# Patient Record
Sex: FEMALE | Race: WHITE | Employment: OTHER | URBAN - METROPOLITAN AREA
[De-identification: names, ages, dates, MRNs, and addresses within clinical notes are randomized per-mention and may not be internally consistent; named-entity substitution may affect disease eponyms.]

---

## 2023-12-29 ENCOUNTER — HOSPITAL ENCOUNTER (INPATIENT)
Age: 78
LOS: 5 days | Discharge: HOME | DRG: 322 | End: 2024-01-03
Attending: EMERGENCY MEDICINE | Admitting: FAMILY MEDICINE
Payer: COMMERCIAL

## 2023-12-29 ENCOUNTER — APPOINTMENT (OUTPATIENT)
Dept: GENERAL RADIOLOGY | Age: 78
DRG: 322 | End: 2023-12-29
Payer: COMMERCIAL

## 2023-12-29 ENCOUNTER — APPOINTMENT (OUTPATIENT)
Dept: CT IMAGING | Age: 78
DRG: 322 | End: 2023-12-29
Payer: COMMERCIAL

## 2023-12-29 DIAGNOSIS — K81.0 ACUTE CHOLECYSTITIS: Primary | ICD-10-CM

## 2023-12-29 DIAGNOSIS — R79.89 ELEVATED TROPONIN: ICD-10-CM

## 2023-12-29 DIAGNOSIS — E87.20 LACTIC ACIDOSIS: ICD-10-CM

## 2023-12-29 PROBLEM — I21.4 NSTEMI (NON-ST ELEVATED MYOCARDIAL INFARCTION) (HCC): Status: ACTIVE | Noted: 2023-12-29

## 2023-12-29 LAB
ALBUMIN SERPL-MCNC: 3.7 G/DL (ref 3.4–5)
ALBUMIN/GLOB SERPL: 1.5 {RATIO} (ref 1.1–2.2)
ALP SERPL-CCNC: 101 U/L (ref 40–129)
ALT SERPL-CCNC: 8 U/L (ref 10–40)
ANION GAP SERPL CALCULATED.3IONS-SCNC: 11 MMOL/L (ref 3–16)
ANTI-XA UNFRAC HEPARIN: <0.1 IU/ML (ref 0.3–0.7)
APTT BLD: 25.6 SEC (ref 22.7–35.9)
AST SERPL-CCNC: 16 U/L (ref 15–37)
BACTERIA URNS QL MICRO: ABNORMAL /HPF
BASOPHILS # BLD: 0.1 K/UL (ref 0–0.2)
BASOPHILS NFR BLD: 0.6 %
BILIRUB SERPL-MCNC: 0.4 MG/DL (ref 0–1)
BILIRUB UR QL STRIP.AUTO: NEGATIVE
BUN SERPL-MCNC: 9 MG/DL (ref 7–20)
CALCIUM SERPL-MCNC: 8.5 MG/DL (ref 8.3–10.6)
CHLORIDE SERPL-SCNC: 103 MMOL/L (ref 99–110)
CLARITY UR: ABNORMAL
CO2 SERPL-SCNC: 20 MMOL/L (ref 21–32)
COLOR UR: YELLOW
CREAT SERPL-MCNC: <0.5 MG/DL (ref 0.6–1.2)
DEPRECATED RDW RBC AUTO: 14.3 % (ref 12.4–15.4)
EOSINOPHIL # BLD: 0 K/UL (ref 0–0.6)
EOSINOPHIL NFR BLD: 0.1 %
EPI CELLS #/AREA URNS AUTO: 1 /HPF (ref 0–5)
GFR SERPLBLD CREATININE-BSD FMLA CKD-EPI: >60 ML/MIN/{1.73_M2}
GLUCOSE SERPL-MCNC: 139 MG/DL (ref 70–99)
GLUCOSE UR STRIP.AUTO-MCNC: 100 MG/DL
HCT VFR BLD AUTO: 40.7 % (ref 36–48)
HGB BLD-MCNC: 13.9 G/DL (ref 12–16)
HGB UR QL STRIP.AUTO: ABNORMAL
HYALINE CASTS #/AREA URNS AUTO: 1 /LPF (ref 0–8)
INR PPP: 0.96 (ref 0.84–1.16)
KETONES UR STRIP.AUTO-MCNC: 15 MG/DL
LACTATE BLDV-SCNC: 1.6 MMOL/L (ref 0.4–1.9)
LACTATE BLDV-SCNC: 2 MMOL/L (ref 0.4–1.9)
LEUKOCYTE ESTERASE UR QL STRIP.AUTO: ABNORMAL
LIPASE SERPL-CCNC: 20 U/L (ref 13–60)
LYMPHOCYTES # BLD: 0.8 K/UL (ref 1–5.1)
LYMPHOCYTES NFR BLD: 9 %
MAGNESIUM SERPL-MCNC: 1.8 MG/DL (ref 1.8–2.4)
MCH RBC QN AUTO: 29.4 PG (ref 26–34)
MCHC RBC AUTO-ENTMCNC: 34.1 G/DL (ref 31–36)
MCV RBC AUTO: 86.3 FL (ref 80–100)
MONOCYTES # BLD: 0.3 K/UL (ref 0–1.3)
MONOCYTES NFR BLD: 3 %
NEUTROPHILS # BLD: 8 K/UL (ref 1.7–7.7)
NEUTROPHILS NFR BLD: 87.3 %
NITRITE UR QL STRIP.AUTO: NEGATIVE
PH UR STRIP.AUTO: 7.5 [PH] (ref 5–8)
PLATELET # BLD AUTO: 207 K/UL (ref 135–450)
PMV BLD AUTO: 7.9 FL (ref 5–10.5)
POTASSIUM SERPL-SCNC: 3.5 MMOL/L (ref 3.5–5.1)
PROT SERPL-MCNC: 6.2 G/DL (ref 6.4–8.2)
PROT UR STRIP.AUTO-MCNC: 30 MG/DL
PROTHROMBIN TIME: 12.8 SEC (ref 11.5–14.8)
RBC # BLD AUTO: 4.72 M/UL (ref 4–5.2)
RBC CLUMPS #/AREA URNS AUTO: 12 /HPF (ref 0–4)
SODIUM SERPL-SCNC: 134 MMOL/L (ref 136–145)
SP GR UR STRIP.AUTO: 1.02 (ref 1–1.03)
TROPONIN, HIGH SENSITIVITY: 127 NG/L (ref 0–14)
TROPONIN, HIGH SENSITIVITY: 98 NG/L (ref 0–14)
UA COMPLETE W REFLEX CULTURE PNL UR: ABNORMAL
UA DIPSTICK W REFLEX MICRO PNL UR: YES
URN SPEC COLLECT METH UR: ABNORMAL
UROBILINOGEN UR STRIP-ACNC: 1 E.U./DL
WBC # BLD AUTO: 9.1 K/UL (ref 4–11)
WBC #/AREA URNS AUTO: 8 /HPF (ref 0–5)

## 2023-12-29 PROCEDURE — 96372 THER/PROPH/DIAG INJ SC/IM: CPT

## 2023-12-29 PROCEDURE — 96376 TX/PRO/DX INJ SAME DRUG ADON: CPT

## 2023-12-29 PROCEDURE — 83605 ASSAY OF LACTIC ACID: CPT

## 2023-12-29 PROCEDURE — 81001 URINALYSIS AUTO W/SCOPE: CPT

## 2023-12-29 PROCEDURE — 83690 ASSAY OF LIPASE: CPT

## 2023-12-29 PROCEDURE — 84484 ASSAY OF TROPONIN QUANT: CPT

## 2023-12-29 PROCEDURE — 85730 THROMBOPLASTIN TIME PARTIAL: CPT

## 2023-12-29 PROCEDURE — 85520 HEPARIN ASSAY: CPT

## 2023-12-29 PROCEDURE — 83735 ASSAY OF MAGNESIUM: CPT

## 2023-12-29 PROCEDURE — 6360000002 HC RX W HCPCS: Performed by: FAMILY MEDICINE

## 2023-12-29 PROCEDURE — 99285 EMERGENCY DEPT VISIT HI MDM: CPT

## 2023-12-29 PROCEDURE — 2100000000 HC CCU R&B

## 2023-12-29 PROCEDURE — 2580000003 HC RX 258: Performed by: EMERGENCY MEDICINE

## 2023-12-29 PROCEDURE — 2140000000 HC CCU INTERMEDIATE R&B

## 2023-12-29 PROCEDURE — 74177 CT ABD & PELVIS W/CONTRAST: CPT

## 2023-12-29 PROCEDURE — 96365 THER/PROPH/DIAG IV INF INIT: CPT

## 2023-12-29 PROCEDURE — 85025 COMPLETE CBC W/AUTO DIFF WBC: CPT

## 2023-12-29 PROCEDURE — 93005 ELECTROCARDIOGRAM TRACING: CPT | Performed by: EMERGENCY MEDICINE

## 2023-12-29 PROCEDURE — 71045 X-RAY EXAM CHEST 1 VIEW: CPT

## 2023-12-29 PROCEDURE — 36415 COLL VENOUS BLD VENIPUNCTURE: CPT

## 2023-12-29 PROCEDURE — 96375 TX/PRO/DX INJ NEW DRUG ADDON: CPT

## 2023-12-29 PROCEDURE — 6360000004 HC RX CONTRAST MEDICATION: Performed by: EMERGENCY MEDICINE

## 2023-12-29 PROCEDURE — 85610 PROTHROMBIN TIME: CPT

## 2023-12-29 PROCEDURE — 2000000000 HC ICU R&B

## 2023-12-29 PROCEDURE — 80053 COMPREHEN METABOLIC PANEL: CPT

## 2023-12-29 PROCEDURE — 6360000002 HC RX W HCPCS: Performed by: EMERGENCY MEDICINE

## 2023-12-29 RX ORDER — SODIUM CHLORIDE 0.9 % (FLUSH) 0.9 %
5-40 SYRINGE (ML) INJECTION EVERY 12 HOURS SCHEDULED
Status: DISCONTINUED | OUTPATIENT
Start: 2023-12-29 | End: 2024-01-03 | Stop reason: HOSPADM

## 2023-12-29 RX ORDER — METOCLOPRAMIDE HYDROCHLORIDE 5 MG/ML
10 INJECTION INTRAMUSCULAR; INTRAVENOUS ONCE
Status: COMPLETED | OUTPATIENT
Start: 2023-12-29 | End: 2023-12-29

## 2023-12-29 RX ORDER — HEPARIN SODIUM 1000 [USP'U]/ML
60 INJECTION, SOLUTION INTRAVENOUS; SUBCUTANEOUS PRN
Status: DISCONTINUED | OUTPATIENT
Start: 2023-12-29 | End: 2024-01-02 | Stop reason: ALTCHOICE

## 2023-12-29 RX ORDER — ONDANSETRON 2 MG/ML
4 INJECTION INTRAMUSCULAR; INTRAVENOUS EVERY 6 HOURS PRN
Status: DISCONTINUED | OUTPATIENT
Start: 2023-12-29 | End: 2024-01-03 | Stop reason: HOSPADM

## 2023-12-29 RX ORDER — PROMETHAZINE HYDROCHLORIDE 25 MG/ML
25 INJECTION, SOLUTION INTRAMUSCULAR; INTRAVENOUS ONCE
Status: COMPLETED | OUTPATIENT
Start: 2023-12-29 | End: 2023-12-29

## 2023-12-29 RX ORDER — ACETAMINOPHEN 325 MG/1
650 TABLET ORAL EVERY 6 HOURS PRN
Status: DISCONTINUED | OUTPATIENT
Start: 2023-12-29 | End: 2024-01-03 | Stop reason: HOSPADM

## 2023-12-29 RX ORDER — HYDROMORPHONE HYDROCHLORIDE 1 MG/ML
0.25 INJECTION, SOLUTION INTRAMUSCULAR; INTRAVENOUS; SUBCUTANEOUS EVERY 4 HOURS PRN
Status: DISCONTINUED | OUTPATIENT
Start: 2023-12-29 | End: 2024-01-03 | Stop reason: HOSPADM

## 2023-12-29 RX ORDER — 0.9 % SODIUM CHLORIDE 0.9 %
500 INTRAVENOUS SOLUTION INTRAVENOUS ONCE
Status: COMPLETED | OUTPATIENT
Start: 2023-12-29 | End: 2023-12-29

## 2023-12-29 RX ORDER — HEPARIN SODIUM 10000 [USP'U]/100ML
5-30 INJECTION, SOLUTION INTRAVENOUS CONTINUOUS
Status: DISCONTINUED | OUTPATIENT
Start: 2023-12-29 | End: 2024-01-02 | Stop reason: ALTCHOICE

## 2023-12-29 RX ORDER — HYDROMORPHONE HYDROCHLORIDE 1 MG/ML
0.5 INJECTION, SOLUTION INTRAMUSCULAR; INTRAVENOUS; SUBCUTANEOUS
Status: DISCONTINUED | OUTPATIENT
Start: 2023-12-29 | End: 2023-12-29 | Stop reason: HOSPADM

## 2023-12-29 RX ORDER — METRONIDAZOLE 500 MG/100ML
500 INJECTION, SOLUTION INTRAVENOUS ONCE
Status: COMPLETED | OUTPATIENT
Start: 2023-12-29 | End: 2023-12-29

## 2023-12-29 RX ORDER — POTASSIUM CHLORIDE 20 MEQ/1
40 TABLET, EXTENDED RELEASE ORAL PRN
Status: DISCONTINUED | OUTPATIENT
Start: 2023-12-29 | End: 2024-01-03 | Stop reason: HOSPADM

## 2023-12-29 RX ORDER — HEPARIN SODIUM 1000 [USP'U]/ML
30 INJECTION, SOLUTION INTRAVENOUS; SUBCUTANEOUS PRN
Status: DISCONTINUED | OUTPATIENT
Start: 2023-12-29 | End: 2024-01-02 | Stop reason: ALTCHOICE

## 2023-12-29 RX ORDER — ONDANSETRON 4 MG/1
4 TABLET, ORALLY DISINTEGRATING ORAL EVERY 8 HOURS PRN
Status: DISCONTINUED | OUTPATIENT
Start: 2023-12-29 | End: 2024-01-03 | Stop reason: HOSPADM

## 2023-12-29 RX ORDER — POLYETHYLENE GLYCOL 3350 17 G/17G
17 POWDER, FOR SOLUTION ORAL DAILY PRN
Status: DISCONTINUED | OUTPATIENT
Start: 2023-12-29 | End: 2024-01-03 | Stop reason: HOSPADM

## 2023-12-29 RX ORDER — ACETAMINOPHEN 650 MG/1
650 SUPPOSITORY RECTAL EVERY 6 HOURS PRN
Status: DISCONTINUED | OUTPATIENT
Start: 2023-12-29 | End: 2024-01-03 | Stop reason: HOSPADM

## 2023-12-29 RX ORDER — HEPARIN SODIUM 1000 [USP'U]/ML
60 INJECTION, SOLUTION INTRAVENOUS; SUBCUTANEOUS ONCE
Status: COMPLETED | OUTPATIENT
Start: 2023-12-29 | End: 2023-12-29

## 2023-12-29 RX ORDER — SODIUM CHLORIDE 9 MG/ML
INJECTION, SOLUTION INTRAVENOUS PRN
Status: DISCONTINUED | OUTPATIENT
Start: 2023-12-29 | End: 2024-01-03 | Stop reason: HOSPADM

## 2023-12-29 RX ORDER — SODIUM CHLORIDE 0.9 % (FLUSH) 0.9 %
5-40 SYRINGE (ML) INJECTION PRN
Status: DISCONTINUED | OUTPATIENT
Start: 2023-12-29 | End: 2024-01-03 | Stop reason: HOSPADM

## 2023-12-29 RX ORDER — MAGNESIUM SULFATE IN WATER 40 MG/ML
2000 INJECTION, SOLUTION INTRAVENOUS PRN
Status: DISCONTINUED | OUTPATIENT
Start: 2023-12-29 | End: 2024-01-03 | Stop reason: HOSPADM

## 2023-12-29 RX ORDER — POTASSIUM CHLORIDE 7.45 MG/ML
10 INJECTION INTRAVENOUS PRN
Status: DISCONTINUED | OUTPATIENT
Start: 2023-12-29 | End: 2024-01-03 | Stop reason: HOSPADM

## 2023-12-29 RX ADMIN — HYDROMORPHONE HYDROCHLORIDE 0.5 MG: 1 INJECTION, SOLUTION INTRAMUSCULAR; INTRAVENOUS; SUBCUTANEOUS at 13:28

## 2023-12-29 RX ADMIN — CEFAZOLIN 2000 MG: 2 INJECTION, POWDER, FOR SOLUTION INTRAMUSCULAR; INTRAVENOUS at 16:47

## 2023-12-29 RX ADMIN — METOCLOPRAMIDE 10 MG: 5 INJECTION, SOLUTION INTRAMUSCULAR; INTRAVENOUS at 13:28

## 2023-12-29 RX ADMIN — HYDROMORPHONE HYDROCHLORIDE 0.5 MG: 1 INJECTION, SOLUTION INTRAMUSCULAR; INTRAVENOUS; SUBCUTANEOUS at 16:48

## 2023-12-29 RX ADMIN — HEPARIN SODIUM 3920 UNITS: 1000 INJECTION INTRAVENOUS; SUBCUTANEOUS at 19:28

## 2023-12-29 RX ADMIN — HYDROMORPHONE HYDROCHLORIDE 0.25 MG: 1 INJECTION, SOLUTION INTRAMUSCULAR; INTRAVENOUS; SUBCUTANEOUS at 23:43

## 2023-12-29 RX ADMIN — ONDANSETRON 4 MG: 2 INJECTION INTRAMUSCULAR; INTRAVENOUS at 23:42

## 2023-12-29 RX ADMIN — METRONIDAZOLE 500 MG: 500 INJECTION, SOLUTION INTRAVENOUS at 18:30

## 2023-12-29 RX ADMIN — IOPAMIDOL 75 ML: 755 INJECTION, SOLUTION INTRAVENOUS at 14:55

## 2023-12-29 RX ADMIN — PROMETHAZINE HYDROCHLORIDE 25 MG: 25 INJECTION INTRAMUSCULAR; INTRAVENOUS at 15:56

## 2023-12-29 RX ADMIN — SODIUM CHLORIDE 500 ML: 9 INJECTION, SOLUTION INTRAVENOUS at 13:28

## 2023-12-29 RX ADMIN — HEPARIN SODIUM 12 UNITS/KG/HR: 10000 INJECTION, SOLUTION INTRAVENOUS at 19:28

## 2023-12-29 ASSESSMENT — PAIN SCALES - GENERAL
PAINLEVEL_OUTOF10: 9
PAINLEVEL_OUTOF10: 7
PAINLEVEL_OUTOF10: 0
PAINLEVEL_OUTOF10: 9

## 2023-12-29 ASSESSMENT — PAIN DESCRIPTION - LOCATION: LOCATION: ABDOMEN

## 2023-12-29 NOTE — PROGRESS NOTES
Pharmacy Home Medication Reconciliation Note    A medication reconciliation has been completed for Jacqui Mcfadden 1945    Pharmacy: [address] [facility], [place], [place]  Information provided by: patient's daughter    The patient's home medication list is as follows:  No current facility-administered medications on file prior to encounter.     Current Outpatient Medications on File Prior to Encounter   Medication Sig Dispense Refill    acetaminophen (TYLENOL) 325 MG tablet Take 2 tablets by mouth every 6 hours as needed for Pain (Arthritis pain.)      ibuprofen (IBU) 800 MG tablet Take 1 tablet by mouth every 8 hours as needed for Pain for 20 doses. 20 tablet 0         Timing of last doses updated.    Thank you,  Juany Carrasco CPhT

## 2023-12-29 NOTE — ED PROVIDER NOTES
EMERGENCY MEDICINE PROVIDER NOTE    Patient Identification  Pt Name: Jacqui Mcfadden  MRN: [id number]  Birthdate 1945  Date of evaluation: 12/29/2023  Provider: Kenton Araya MD  PCP: Tammy Anderson MD    Chief Complaint  Abdominal Pain (PT to ED via [facility] from home c/o abdominal pain, lower back pain, N/V and SOB. PT is a poor historian. PT took tylenol yesterday. Zofran IV given en route. ), Back Pain, Shortness of Breath, Nausea, and Nausea & Vomiting      HPI  (History provided by patient)  This is a 78 y.o. female who was brought in by EMS transportation for abdominal pain, nausea, vomiting.  This developed suddenly this morning and has been present throughout the day.  She was well which went to bed last night.  She is also expriencing excessive burping.  Pain is localized to the mid abdomen with radiation into her back and chest.  She initially reported shortness of breath, but reports she actually has pain with deep breathing, specifically worsening abdominal pain when she breathes deep.  She has not had having bad emesis or hematochezia.  She denies cough and hemoptysis.  She has never experienced anything like this before.  She has not been ill recently nor has she had recent sick contact    I have reviewed the following nursing documentation:  Allergies: Demerol, Morphine, Percocet [oxycodone-acetaminophen], and Vicodin [hydrocodone-acetaminophen]    Past medical history:   Past Medical History:   Diagnosis Date    Histoplasmosis     bilateral eyes    Legally blind      Past surgical history:   Past Surgical History:   Procedure Laterality Date    APPENDECTOMY      NECK SURGERY      2007       Home medications:   Previous Medications    ACETAMINOPHEN (TYLENOL) 325 MG TABLET    Take 650 mg by mouth every 6 hours as needed.      IBUPROFEN (IBU) 800 MG TABLET    Take 1 tablet by mouth every 8 hours as needed for Pain for 20 doses.       Social history:  reports that she has been smoking.

## 2023-12-30 ENCOUNTER — APPOINTMENT (OUTPATIENT)
Dept: ULTRASOUND IMAGING | Age: 78
DRG: 322 | End: 2023-12-30
Payer: COMMERCIAL

## 2023-12-30 PROBLEM — I25.10 CORONARY ARTERY CALCIFICATION: Status: ACTIVE | Noted: 2023-12-30

## 2023-12-30 PROBLEM — R79.89 ELEVATED TROPONIN: Status: ACTIVE | Noted: 2023-12-30

## 2023-12-30 PROBLEM — K80.20 GALLSTONES: Status: ACTIVE | Noted: 2023-12-30

## 2023-12-30 PROBLEM — I25.84 CORONARY ARTERY CALCIFICATION: Status: ACTIVE | Noted: 2023-12-30

## 2023-12-30 LAB
ANION GAP SERPL CALCULATED.3IONS-SCNC: 8 MMOL/L (ref 3–16)
ANTI-XA UNFRAC HEPARIN: 0.25 IU/ML (ref 0.3–0.7)
ANTI-XA UNFRAC HEPARIN: 0.42 IU/ML (ref 0.3–0.7)
ANTI-XA UNFRAC HEPARIN: 0.46 IU/ML (ref 0.3–0.7)
ANTI-XA UNFRAC HEPARIN: 0.5 IU/ML (ref 0.3–0.7)
BUN SERPL-MCNC: 8 MG/DL (ref 7–20)
CALCIUM SERPL-MCNC: 9.3 MG/DL (ref 8.3–10.6)
CHLORIDE SERPL-SCNC: 101 MMOL/L (ref 99–110)
CO2 SERPL-SCNC: 26 MMOL/L (ref 21–32)
CREAT SERPL-MCNC: <0.5 MG/DL (ref 0.6–1.2)
DEPRECATED RDW RBC AUTO: 14.4 % (ref 12.4–15.4)
EKG ATRIAL RATE: 60 BPM
EKG DIAGNOSIS: NORMAL
EKG P AXIS: 77 DEGREES
EKG P-R INTERVAL: 222 MS
EKG Q-T INTERVAL: 476 MS
EKG QRS DURATION: 74 MS
EKG QTC CALCULATION (BAZETT): 476 MS
EKG R AXIS: 57 DEGREES
EKG T AXIS: 48 DEGREES
EKG VENTRICULAR RATE: 60 BPM
GFR SERPLBLD CREATININE-BSD FMLA CKD-EPI: >60 ML/MIN/{1.73_M2}
GLUCOSE SERPL-MCNC: 118 MG/DL (ref 70–99)
HCT VFR BLD AUTO: 41.3 % (ref 36–48)
HGB BLD-MCNC: 14 G/DL (ref 12–16)
MCH RBC QN AUTO: 29.2 PG (ref 26–34)
MCHC RBC AUTO-ENTMCNC: 33.9 G/DL (ref 31–36)
MCV RBC AUTO: 86.1 FL (ref 80–100)
PLATELET # BLD AUTO: 219 K/UL (ref 135–450)
PMV BLD AUTO: 7.7 FL (ref 5–10.5)
POTASSIUM SERPL-SCNC: 3.8 MMOL/L (ref 3.5–5.1)
RBC # BLD AUTO: 4.8 M/UL (ref 4–5.2)
SODIUM SERPL-SCNC: 135 MMOL/L (ref 136–145)
TROPONIN, HIGH SENSITIVITY: 193 NG/L (ref 0–14)
TROPONIN, HIGH SENSITIVITY: 283 NG/L (ref 0–14)
WBC # BLD AUTO: 13.1 K/UL (ref 4–11)

## 2023-12-30 PROCEDURE — 80048 BASIC METABOLIC PNL TOTAL CA: CPT

## 2023-12-30 PROCEDURE — 6370000000 HC RX 637 (ALT 250 FOR IP): Performed by: INTERNAL MEDICINE

## 2023-12-30 PROCEDURE — 36415 COLL VENOUS BLD VENIPUNCTURE: CPT

## 2023-12-30 PROCEDURE — 84484 ASSAY OF TROPONIN QUANT: CPT

## 2023-12-30 PROCEDURE — 2140000000 HC CCU INTERMEDIATE R&B

## 2023-12-30 PROCEDURE — 6360000002 HC RX W HCPCS: Performed by: INTERNAL MEDICINE

## 2023-12-30 PROCEDURE — 99291 CRITICAL CARE FIRST HOUR: CPT | Performed by: INTERNAL MEDICINE

## 2023-12-30 PROCEDURE — 93010 ELECTROCARDIOGRAM REPORT: CPT | Performed by: INTERNAL MEDICINE

## 2023-12-30 PROCEDURE — 6360000002 HC RX W HCPCS: Performed by: FAMILY MEDICINE

## 2023-12-30 PROCEDURE — 2580000003 HC RX 258: Performed by: FAMILY MEDICINE

## 2023-12-30 PROCEDURE — 76705 ECHO EXAM OF ABDOMEN: CPT

## 2023-12-30 PROCEDURE — 6360000002 HC RX W HCPCS: Performed by: EMERGENCY MEDICINE

## 2023-12-30 PROCEDURE — 87040 BLOOD CULTURE FOR BACTERIA: CPT

## 2023-12-30 PROCEDURE — 2000000000 HC ICU R&B

## 2023-12-30 PROCEDURE — 85520 HEPARIN ASSAY: CPT

## 2023-12-30 PROCEDURE — 6370000000 HC RX 637 (ALT 250 FOR IP): Performed by: FAMILY MEDICINE

## 2023-12-30 PROCEDURE — 85027 COMPLETE CBC AUTOMATED: CPT

## 2023-12-30 RX ORDER — MAGNESIUM HYDROXIDE/ALUMINUM HYDROXICE/SIMETHICONE 120; 1200; 1200 MG/30ML; MG/30ML; MG/30ML
30 SUSPENSION ORAL EVERY 6 HOURS PRN
Status: DISCONTINUED | OUTPATIENT
Start: 2023-12-30 | End: 2024-01-03 | Stop reason: HOSPADM

## 2023-12-30 RX ORDER — CALCIUM CARBONATE 500 MG/1
500 TABLET, CHEWABLE ORAL 3 TIMES DAILY PRN
Status: DISCONTINUED | OUTPATIENT
Start: 2023-12-30 | End: 2024-01-03 | Stop reason: HOSPADM

## 2023-12-30 RX ORDER — ASPIRIN 81 MG/1
81 TABLET, CHEWABLE ORAL DAILY
Status: DISCONTINUED | OUTPATIENT
Start: 2023-12-31 | End: 2024-01-02 | Stop reason: SDUPTHER

## 2023-12-30 RX ORDER — ASPIRIN 325 MG/1
325 TABLET, FILM COATED ORAL ONCE
Status: COMPLETED | OUTPATIENT
Start: 2023-12-30 | End: 2023-12-30

## 2023-12-30 RX ORDER — NITROGLYCERIN 20 MG/100ML
5-200 INJECTION INTRAVENOUS CONTINUOUS
Status: DISCONTINUED | OUTPATIENT
Start: 2023-12-30 | End: 2024-01-02 | Stop reason: ALTCHOICE

## 2023-12-30 RX ADMIN — ACETAMINOPHEN 650 MG: 325 TABLET ORAL at 22:44

## 2023-12-30 RX ADMIN — NITROGLYCERIN 10 MCG/MIN: 20 INJECTION INTRAVENOUS at 11:00

## 2023-12-30 RX ADMIN — Medication 10 ML: at 22:00

## 2023-12-30 RX ADMIN — Medication 20 ML: at 09:44

## 2023-12-30 RX ADMIN — ONDANSETRON 4 MG: 2 INJECTION INTRAMUSCULAR; INTRAVENOUS at 09:09

## 2023-12-30 RX ADMIN — ALUMINUM HYDROXIDE, MAGNESIUM HYDROXIDE, AND SIMETHICONE 30 ML: 200; 200; 20 SUSPENSION ORAL at 11:03

## 2023-12-30 RX ADMIN — ASPIRIN 325 MG: 325 TABLET, FILM COATED ORAL at 10:57

## 2023-12-30 RX ADMIN — HEPARIN SODIUM 1960 UNITS: 1000 INJECTION INTRAVENOUS; SUBCUTANEOUS at 09:37

## 2023-12-30 ASSESSMENT — PAIN SCALES - GENERAL
PAINLEVEL_OUTOF10: 0

## 2023-12-30 ASSESSMENT — PAIN DESCRIPTION - LOCATION: LOCATION: ABDOMEN;BACK

## 2023-12-30 ASSESSMENT — PAIN DESCRIPTION - ORIENTATION: ORIENTATION: LEFT

## 2023-12-30 NOTE — PROGRESS NOTES
Hospitalist Progress Note    Name:  Jacqui Mcfadden    /Age/Sex: 1945  (78 y.o. female)  MRN & CSN:  [id number] & [id number]    PCP: Tammy Anderson MD    Date of Admission: 2023    Patient Status:  Inpatient     Chief Complaint:   Chief Complaint   Patient presents with    Abdominal Pain     PT to ED via [facility] from home c/o abdominal pain, lower back pain, N/V and SOB. PT is a poor historian. PT took tylenol yesterday. Zofran IV given en route.     Back Pain    Shortness of Breath    Nausea    Nausea & Vomiting       Hospital Course:      Jacqui Mcfadden is a 78 y.o. female with pmh of who presents with c/o abdominal pain , nausea, vomiting , dyspnea. The pain is radiating into her chest and back. Sx are worsened with deep inspiration. The sx started earlier today.   ED work up showed   Elevated troponin   92 serial troponin is trending up  CT abdomen showed questionable cholecysititis    Subjective:  Today is:  Hospital Day: 2.  Patient seen and examined in CVU-/2911.     Concern for the chest pain, but troponin is trending down.    Patient is having pain the right lower quadrant and a having some chest discomfort.           Medications:  Reviewed    Infusion Medications    nitroGLYCERIN 10 mcg/min (23 1100)    heparin (PORCINE) Infusion 14 Units/kg/hr (23 0939)    sodium chloride       Scheduled Medications    [START ON 2023] aspirin  81 mg Oral Daily    sodium chloride flush  5-40 mL IntraVENous 2 times per day     PRN Meds: calcium carbonate, aluminum & magnesium hydroxide-simethicone, heparin (porcine), heparin (porcine), HYDROmorphone, sodium chloride flush, sodium chloride, potassium chloride **OR** potassium alternative oral replacement **OR** potassium chloride, magnesium sulfate, ondansetron **OR** ondansetron, polyethylene glycol, acetaminophen **OR** acetaminophen      Intake/Output Summary (Last 24 hours) at 2023 0394  Last data filed at  12/30/2023 1600  Gross per 24 hour   Intake 340 ml   Output 250 ml   Net 90 ml       Physical Exam Performed:    /71   Pulse 78   Temp 98.1 °F (36.7 °C) (Temporal)   Resp 16   Ht 1.575 m (5' 2\")   Wt 56.5 kg (124 lb 9.6 oz)   SpO2 94%   BMI 22.79 kg/m²     General appearance: No apparent distress, appears stated age and cooperative.   HEENT: Pupils equal, round, and reactive to light. Conjunctivae/corneas clear.  Neck: Supple, with full range of motion. No jugular venous distention. Trachea midline.  Respiratory:  Normal respiratory effort. Decreased airmovment.  Cardiovascular: Regular rate and rhythm with normal S1/S2 without murmurs, rubs or gallops. No peripheral edema.  Abdomen: Soft, tender to palpation in the right lower quadrant.   : No CVA tenderness.  Musculoskeletal: No clubbing or cyanosis.  Full range of motion without deformity.  Skin: Skin color, texture, turgor normal.  No rashes or lesions.  Neurologic:  Neurovascularly intact without any focal sensory/motor deficits. Cranial nerves: II-XII intact, grossly non-focal.  Psychiatric: Alert and oriented, thought content appropriate, normal insight  Capillary Refill: Brisk, 3 seconds, normal   Peripheral Pulses: +2 palpable, equal bilaterally       Labs:   Recent Labs     12/29/23  1331 12/30/23  0216   WBC 9.1 13.1*   HGB 13.9 14.0   HCT 40.7 41.3    219     Recent Labs     12/29/23  1331 12/30/23  0216   * 135*   K 3.5 3.8    101   CO2 20* 26   BUN 9 8   CREATININE <0.5* <0.5*   CALCIUM 8.5 9.3     Recent Labs     12/29/23  1331   AST 16   ALT 8*   BILITOT 0.4   ALKPHOS 101     Recent Labs     12/29/23  1837   INR 0.96     No results for input(s): \"CKTOTAL\", \"TROPONINI\" in the last 72 hours.    Urinalysis:      Lab Results   Component Value Date/Time    NITRU Negative 12/29/2023 01:31 PM    WBCUA 8 12/29/2023 01:31 PM    BACTERIA None Seen 12/29/2023 01:31 PM    RBCUA 12 12/29/2023 01:31 PM    BLOODU SMALL 12/29/2023

## 2023-12-30 NOTE — FLOWSHEET NOTE
4 Eyes Skin Assessment     NAME:  Jacqui Mcfadden  YOB: 1945  MEDICAL RECORD NUMBER:  [id number]    The patient is being assessed for  Admission    I agree that at least one RN has performed a thorough Head to Toe Skin Assessment on the patient. ALL assessment sites listed below have been assessed.      Areas assessed by both nurses:    Head, Face, Ears, Shoulders, Back, Chest, Arms, Elbows, Hands, Sacrum. Buttock, Coccyx, Ischium, and Legs. Feet and Heels        Does the Patient have a Wound? No noted wound(s)       Jose Prevention initiated by RN: Yes  Wound Care Orders initiated by RN: No    Pressure Injury (Stage 3,4, Unstageable, DTI, NWPT, and Complex wounds) if present, place Wound referral order by RN under : No    New Ostomies, if present place, Ostomy referral order under : No     Nurse 1 eSignature: Electronically signed by SHANA TUTTLE RN on 12/30/23 at 12:07 AM EST    **SHARE this note so that the co-signing nurse can place an eSignature**    Nurse 2 eSignature: Electronically signed by JOSE ROBERTO SEARS RN on 12/30/23 at 12:50 AM EST

## 2023-12-30 NOTE — FLOWSHEET NOTE
Admit to cvu 2911 from ED. Very sleepy, responds to loud voice. Report received from ED RN that recent dilaudid and phenergan were given. Family at bedside to visit and provide contact info. Discussed POC and unit information provided. Oriented to room and call light. Needs met, bed alarm engaged. Will cont to dulce maria.

## 2023-12-30 NOTE — CONSULTS
[facility]  Advanced CHF/Pulmonary Hypertension   Cardiac Evaluation      Jacqui Mcfadden  YOB: 1945    Requesting PHysician:  Dr. Weinberg      Chief Complaint   Patient presents with    Abdominal Pain     PT to ED via [facility] from home c/o abdominal pain, lower back pain, N/V and SOB. PT is a poor historian. PT took tylenol yesterday. Zofran IV given en route.     Back Pain    Shortness of Breath    Nausea    Nausea & Vomiting        History of Present Illness:  Jacqui Mcfadden is a 77 yo female who came in from home by EMS for abdominal pain, nausea, vomiting.  She said this developed suddenly yesterday and was present throughout the day.  She has noticed excessive burping.  Pain is localized to the mid abdomen with radiation into her back and chest.  She initially reported shortness of breath, but the pain is worse with deep breathing.  This morning she is having emesis and nausea.  No hematochezia.  Denies cough or hemoptysis.  No recent illnesses.  She has an unremarkable past medical history.  She apparently is legally blind but lives alone with daughters nearby.    Work up in the ED showed elevated and rising troponin.  CT abdomen suggested possible cholecystitis.  Heparin drip was started in the ED.  Troponin has risen from -283.  Lfts normal.  CT shows coronary artery calcification.  We are consulted for NSTEMI.        Labs:  Sodium 135  K 3.8  BUN/Cre 8/<0.5  Troponin 98, 127, 283  ALT 8, AST 16  H/H 14.0/41.3    CT abdomen:  FINDINGS:  Lower Chest: Coronary artery calcification is seen.  Bandlike opacity seen in  the lingula.  De pendant opacity is seen at the bases likely atelectasis.  Small hiatal hernia seen.  There is nonspecific thickening at the GE junction.     Organs: Calcified granuloma seen within the spleen.  No perisplenic fluid     Adrenal glands unremarkable.     Low attenuation seen in the liver, compatible with fatty infiltration.     No gallstones  children: Not on file    Years of education: Not on file    Highest education level: Not on file   Occupational History    Not on file   Tobacco Use    Smoking status: Every Day    Smokeless tobacco: Not on file   Substance and Sexual Activity    Alcohol use: No    Drug use: Not on file    Sexual activity: Yes     Partners: Male   Other Topics Concern    Not on file   Social History Narrative    Not on file     Social Determinants of Health     Financial Resource Strain: Not on file   Food Insecurity: No Food Insecurity (12/29/2023)    Hunger Vital Sign     Worried About Running Out of Food in the Last Year: Never true     Ran Out of Food in the Last Year: Never true   Transportation Needs: No Transportation Needs (12/29/2023)    PRAPARE - Transportation     Lack of Transportation (Medical): No     Lack of Transportation (Non-Medical): No   Physical Activity: Not on file   Stress: Not on file   Social Connections: Not on file   Intimate Partner Violence: Not on file   Housing Stability: Low Risk  (12/29/2023)    Housing Stability Vital Sign     Unable to Pay for Housing in the Last Year: No     Number of Places Lived in the Last Year: 1     Unstable Housing in the Last Year: No       Review of Systems:   Constitutional: there has been no unanticipated weight loss. There's been no change in energy level, sleep pattern, or activity level.     Eyes: No visual changes or diplopia. No scleral icterus.  ENT: No Headaches, hearing loss or vertigo. No mouth sores or sore throat.  Cardiovascular: Reviewed in HPI  Respiratory: No cough or wheezing, no sputum production. No hematemesis.    Gastrointestinal: No abdominal pain, appetite loss, blood in stools. No change in bowel or bladder habits.  Genitourinary: No dysuria, trouble voiding, or hematuria.  Musculoskeletal:  No gait disturbance, weakness or joint complaints.  Integumentary: No rash or pruritis.  Neurological: No headache, diplopia, change in muscle strength,

## 2023-12-30 NOTE — H&P
V2.0  History and Physical      Name:  Jacqui Mcfadden /Age/Sex: 1945  (78 y.o. female)   MRN & CSN:  [id number] & [id number] Encounter Date/Time: 2023 8:18 PM EST   Location:  [facility] PCP: Tammy Anderson MD       Hospital Day: 1    Assessment and Plan:   Jacqui Mcfadden is a 78 y.o. female with a pmh of  who presents with NSTEMI (non-ST elevated myocardial infarction) ([facility])    Hospital Problems             Last Modified POA    * (Principal) NSTEMI (non-ST elevated myocardial infarction) (HCC) 2023 Yes       Plan:  NSTEMI  Elevated troponin trending up   98----> 127  Cont on heparin gtt  Cardiology has been consulted    Acute cholecystitis  CT scan showed questionable gall bladder wall thickness   With pericholecystic fluid  Liver enzymes and bili remain normal  Will get Gall bladder US  Surgery has been consulted by ED   Pain control  On Clears for now NPO after midnight  No indication for antibiotics at this time      Disposition:       Diet No diet orders on file   DVT Prophylaxis [] Lovenox, []  Heparin, [] SCDs, [] Ambulation,  [] Eliquis, [] Xarelto, [] Coumadin   Code Status No Order   Surrogate Decision Maker/ POA      Personally reviewed Lab Studies and Imaging     Imaging that was interpreted personally includes  and results       History from:         History of Present Illness:     Chief Complaint:   Jacqui Mcfadden is a 78 y.o. female with pmh of who presents with c/o abdominal pain , nausea, vomiting , dyspnea. The pain is radiating into her chest and back. Sx are worsened with deep inspiration. The sx started earlier today.   ED work up showed   Elevated troponin   92 serial troponin is trending up  CT abdomen showed questionable cholecysititis     Review of Systems:        Pertinent positives and negatives discussed in HPI     Objective:     Intake/Output Summary (Last 24 hours) at 2023 2018  Last data filed at 2023 1930  Gross per 24 hour   Intake 600 ml

## 2023-12-31 PROBLEM — K81.0 ACUTE CHOLECYSTITIS: Status: ACTIVE | Noted: 2023-12-31

## 2023-12-31 LAB
ALBUMIN SERPL-MCNC: 4 G/DL (ref 3.4–5)
ALP SERPL-CCNC: 93 U/L (ref 40–129)
ALT SERPL-CCNC: 9 U/L (ref 10–40)
ANION GAP SERPL CALCULATED.3IONS-SCNC: 10 MMOL/L (ref 3–16)
ANTI-XA UNFRAC HEPARIN: 0.4 IU/ML (ref 0.3–0.7)
AST SERPL-CCNC: 26 U/L (ref 15–37)
BILIRUB DIRECT SERPL-MCNC: <0.2 MG/DL (ref 0–0.3)
BILIRUB INDIRECT SERPL-MCNC: ABNORMAL MG/DL (ref 0–1)
BILIRUB SERPL-MCNC: 0.7 MG/DL (ref 0–1)
BUN SERPL-MCNC: 11 MG/DL (ref 7–20)
CALCIUM SERPL-MCNC: 9 MG/DL (ref 8.3–10.6)
CHLORIDE SERPL-SCNC: 99 MMOL/L (ref 99–110)
CO2 SERPL-SCNC: 29 MMOL/L (ref 21–32)
CREAT SERPL-MCNC: <0.5 MG/DL (ref 0.6–1.2)
DEPRECATED RDW RBC AUTO: 14.5 % (ref 12.4–15.4)
GFR SERPLBLD CREATININE-BSD FMLA CKD-EPI: >60 ML/MIN/{1.73_M2}
GLUCOSE SERPL-MCNC: 99 MG/DL (ref 70–99)
HCT VFR BLD AUTO: 42 % (ref 36–48)
HGB BLD-MCNC: 14.5 G/DL (ref 12–16)
MCH RBC QN AUTO: 29.8 PG (ref 26–34)
MCHC RBC AUTO-ENTMCNC: 34.5 G/DL (ref 31–36)
MCV RBC AUTO: 86.5 FL (ref 80–100)
NT-PROBNP SERPL-MCNC: ABNORMAL PG/ML (ref 0–449)
PLATELET # BLD AUTO: 176 K/UL (ref 135–450)
PMV BLD AUTO: 7.3 FL (ref 5–10.5)
POTASSIUM SERPL-SCNC: 3.5 MMOL/L (ref 3.5–5.1)
PROT SERPL-MCNC: 6.5 G/DL (ref 6.4–8.2)
RBC # BLD AUTO: 4.86 M/UL (ref 4–5.2)
SODIUM SERPL-SCNC: 138 MMOL/L (ref 136–145)
TROPONIN, HIGH SENSITIVITY: 111 NG/L (ref 0–14)
WBC # BLD AUTO: 9.7 K/UL (ref 4–11)

## 2023-12-31 PROCEDURE — 99223 1ST HOSP IP/OBS HIGH 75: CPT | Performed by: SURGERY

## 2023-12-31 PROCEDURE — 83880 ASSAY OF NATRIURETIC PEPTIDE: CPT

## 2023-12-31 PROCEDURE — C9113 INJ PANTOPRAZOLE SODIUM, VIA: HCPCS | Performed by: SURGERY

## 2023-12-31 PROCEDURE — 2000000000 HC ICU R&B

## 2023-12-31 PROCEDURE — 85027 COMPLETE CBC AUTOMATED: CPT

## 2023-12-31 PROCEDURE — C8929 TTE W OR WO FOL WCON,DOPPLER: HCPCS

## 2023-12-31 PROCEDURE — 6370000000 HC RX 637 (ALT 250 FOR IP): Performed by: INTERNAL MEDICINE

## 2023-12-31 PROCEDURE — 6360000002 HC RX W HCPCS: Performed by: SURGERY

## 2023-12-31 PROCEDURE — 85520 HEPARIN ASSAY: CPT

## 2023-12-31 PROCEDURE — 84484 ASSAY OF TROPONIN QUANT: CPT

## 2023-12-31 PROCEDURE — 36415 COLL VENOUS BLD VENIPUNCTURE: CPT

## 2023-12-31 PROCEDURE — 6360000002 HC RX W HCPCS: Performed by: FAMILY MEDICINE

## 2023-12-31 PROCEDURE — 6370000000 HC RX 637 (ALT 250 FOR IP): Performed by: FAMILY MEDICINE

## 2023-12-31 PROCEDURE — 99291 CRITICAL CARE FIRST HOUR: CPT | Performed by: INTERNAL MEDICINE

## 2023-12-31 PROCEDURE — 2580000003 HC RX 258: Performed by: FAMILY MEDICINE

## 2023-12-31 PROCEDURE — 80076 HEPATIC FUNCTION PANEL: CPT

## 2023-12-31 PROCEDURE — 80048 BASIC METABOLIC PNL TOTAL CA: CPT

## 2023-12-31 PROCEDURE — 6360000004 HC RX CONTRAST MEDICATION: Performed by: INTERNAL MEDICINE

## 2023-12-31 PROCEDURE — 6360000002 HC RX W HCPCS: Performed by: INTERNAL MEDICINE

## 2023-12-31 RX ORDER — PANTOPRAZOLE SODIUM 40 MG/10ML
40 INJECTION, POWDER, LYOPHILIZED, FOR SOLUTION INTRAVENOUS DAILY
Status: DISCONTINUED | OUTPATIENT
Start: 2023-12-31 | End: 2024-01-03 | Stop reason: HOSPADM

## 2023-12-31 RX ORDER — FUROSEMIDE 10 MG/ML
20 INJECTION INTRAMUSCULAR; INTRAVENOUS ONCE
Status: COMPLETED | OUTPATIENT
Start: 2023-12-31 | End: 2023-12-31

## 2023-12-31 RX ORDER — CIPROFLOXACIN 2 MG/ML
400 INJECTION, SOLUTION INTRAVENOUS EVERY 12 HOURS
Status: DISCONTINUED | OUTPATIENT
Start: 2023-12-31 | End: 2024-01-03 | Stop reason: HOSPADM

## 2023-12-31 RX ADMIN — ASPIRIN 81 MG: 81 TABLET, CHEWABLE ORAL at 08:35

## 2023-12-31 RX ADMIN — Medication 10 ML: at 21:14

## 2023-12-31 RX ADMIN — CIPROFLOXACIN 200 MG: 200 INJECTION, SOLUTION INTRAVENOUS at 17:16

## 2023-12-31 RX ADMIN — PERFLUTREN 1.5 ML: 6.52 INJECTION, SUSPENSION INTRAVENOUS at 11:23

## 2023-12-31 RX ADMIN — HEPARIN SODIUM 14 UNITS/KG/HR: 10000 INJECTION, SOLUTION INTRAVENOUS at 01:19

## 2023-12-31 RX ADMIN — PANTOPRAZOLE SODIUM 40 MG: 40 INJECTION, POWDER, FOR SOLUTION INTRAVENOUS at 17:16

## 2023-12-31 RX ADMIN — CIPROFLOXACIN 200 MG: 200 INJECTION, SOLUTION INTRAVENOUS at 19:30

## 2023-12-31 RX ADMIN — ACETAMINOPHEN 650 MG: 325 TABLET ORAL at 21:11

## 2023-12-31 RX ADMIN — FUROSEMIDE 20 MG: 10 INJECTION, SOLUTION INTRAMUSCULAR; INTRAVENOUS at 12:12

## 2023-12-31 RX ADMIN — ONDANSETRON 4 MG: 2 INJECTION INTRAMUSCULAR; INTRAVENOUS at 12:09

## 2023-12-31 RX ADMIN — ANTACID TABLETS 500 MG: 500 TABLET, CHEWABLE ORAL at 12:16

## 2023-12-31 ASSESSMENT — PAIN DESCRIPTION - ORIENTATION: ORIENTATION: MID

## 2023-12-31 ASSESSMENT — PAIN DESCRIPTION - DESCRIPTORS: DESCRIPTORS: CRAMPING

## 2023-12-31 ASSESSMENT — PAIN SCALES - GENERAL
PAINLEVEL_OUTOF10: 0

## 2023-12-31 ASSESSMENT — PAIN DESCRIPTION - LOCATION: LOCATION: ABDOMEN

## 2023-12-31 NOTE — PROGRESS NOTES
[facility]   Progress Note  CHF/Pulmonary Hypertension Cardiology    Chief complaint:  We are following this patient for NSTEMI, possible gallbladder disease  HPI:  Jacqui Mcfadden is a 79 yo female who came in from home by EMS for abdominal pain, nausea, vomiting.  She said this developed suddenly yesterday and was present throughout the day.  She has noticed excessive burping.  Pain is localized to the mid abdomen with radiation into her back and chest.  She initially reported shortness of breath, but the pain is worse with deep breathing.  This morning she is having emesis and nausea.  No hematochezia.  Denies cough or hemoptysis.  No recent illnesses.  She has an unremarkable past medical history.  She apparently is legally blind but lives alone with daughters nearby.     Work up in the ED showed elevated and rising troponin.  CT abdomen suggested possible cholecystitis.  Heparin drip was started in the ED.  Troponin has risen from -283.  Lfts normal.  CT shows coronary artery calcification.  We are consulted for NSTEMI.           Labs:  Sodium 135  K 3.8  BUN/Cre 8/<0.5  Troponin 98, 127, 283  ALT 8, AST 16  H/H 14.0/41.3     CT abdomen:  FINDINGS:  Lower Chest: Coronary artery calcification is seen.  Bandlike opacity seen in  the lingula.  De pendant opacity is seen at the bases likely atelectasis.  Small hiatal hernia seen.  There is nonspecific thickening at the GE junction.     Organs: Calcified granuloma seen within the spleen.  No perisplenic fluid     Adrenal glands unremarkable.     Low attenuation seen in the liver, compatible with fatty infiltration.     No gallstones noted.  There is questionable gallbladder wall thickening  versus trace pericholecystic fluid.     No pancreatic calcification.  No peripancreatic fluid.     No hydronephrosis on right.  No hydronephrosis on left.  Small calcifications  seen in both the right and left kidney, either nonobstructing renal calculi  or     PROTIME 12.8 12/29/2023        Physical Examination:    /79   Pulse 88   Temp 97.7 °F (36.5 °C) (Temporal)   Resp 16   Ht 1.575 m (5' 2\")   Wt 56.8 kg (125 lb 3.2 oz)   SpO2 93%   BMI 22.90 kg/m²      WD/WN  HEENT:  NC/AT  Respiratory:  Resp Assessment: Normal respiratory effort  Resp Auscultation: Clear to auscultation bilaterally   Cardiovascular:  Auscultation: regular rate and rhythm, normal S1S2, no murmur, rub or gallop  Palpation:  Nl PMI  JVP:  normal  Extremities: No Edema  Abdomen:  Soft, non-tender  Normal bowel sounds  Extremities:   No Cyanosis or Clubbing  Neurological/Psychiatric:  Oriented to time, place, and person  Non-anxious  Skin Warm and dry    Lab Results   Component Value Date/Time     12/31/2023 05:50 AM     12/30/2023 02:16 AM     12/29/2023 01:31 PM    K 3.5 12/31/2023 05:50 AM    K 3.8 12/30/2023 02:16 AM    K 3.5 12/29/2023 01:31 PM    BUN 11 12/31/2023 05:50 AM    BUN 8 12/30/2023 02:16 AM    BUN 9 12/29/2023 01:31 PM    CREATININE <0.5 12/31/2023 05:50 AM    CREATININE <0.5 12/30/2023 02:16 AM    CREATININE <0.5 12/29/2023 01:31 PM    GLUCOSE 99 12/31/2023 05:50 AM    GLUCOSE 118 12/30/2023 02:16 AM     No results found for: \"PROBNP\"  Lab Results   Component Value Date    ALT 8 (L) 12/29/2023    AST 16 12/29/2023     Lab Results   Component Value Date/Time    HGB 14.5 12/31/2023 05:50 AM    HGB 14.0 12/30/2023 02:16 AM    HCT 42.0 12/31/2023 05:50 AM    HCT 41.3 12/30/2023 02:16 AM     12/31/2023 05:50 AM     12/30/2023 02:16 AM     No results found for: \"TRIG\", \"HDL\", \"LDLCALC\", \"LDLDIRECT\"    Assessment:    1. NSTEMI   2. Possible cholecystitis   3. Lactic acidosis     4.  Coronary artery calcifications  5/  shortness of breath:  likelyCHF     Plan:   I believe that her symptoms are most consistent with NSTEMI.  She may have gallstones, but we need to address her cardiac issues.  Continue heparin drip  Continue nitroglycerin drip   She

## 2023-12-31 NOTE — CONSULTS
(12/29/2023)    Housing Stability Vital Sign     Unable to Pay for Housing in the Last Year: No     Number of Places Lived in the Last Year: 1     Unstable Housing in the Last Year: No       Allergies:   Allergies   Allergen Reactions    Demerol     Morphine     Percocet [Oxycodone-Acetaminophen]     Vicodin [Hydrocodone-Acetaminophen]        Prior to Admission medications    Medication Sig Start Date End Date Taking? Authorizing Provider   acetaminophen (TYLENOL) 325 MG tablet Take 2 tablets by mouth every 6 hours as needed for Pain (Arthritis pain.)    Provider, MD Hakeem   ibuprofen (IBU) 800 MG tablet Take 1 tablet by mouth every 8 hours as needed for Pain for 20 doses. 5/29/11   Trent Grayson MD       Principal Problem:    NSTEMI (non-ST elevated myocardial infarction) (HCC)  Active Problems:    Elevated troponin    Coronary artery calcification    Gallstones  Resolved Problems:    * No resolved hospital problems. *      Blood pressure 138/89, pulse 81, temperature 98 °F (36.7 °C), temperature source Temporal, resp. rate 16, height 1.575 m (5' 2\"), weight 56.8 kg (125 lb 3.2 oz), SpO2 96 %.    Review of Systems   Constitutional:  Positive for activity change, appetite change and chills. Negative for fever.   HENT:  Negative for congestion and dental problem.    Eyes:  Negative for discharge and itching.   Respiratory:  Negative for apnea and chest tightness.    Cardiovascular:  Negative for chest pain and leg swelling.   Gastrointestinal:  Positive for abdominal pain and nausea.   Endocrine: Negative for cold intolerance and heat intolerance.   Genitourinary:  Negative for difficulty urinating and dyspareunia.   Musculoskeletal:  Negative for arthralgias and back pain.   Skin:  Negative for color change and pallor.   Allergic/Immunologic: Negative for environmental allergies and food allergies.   Neurological:  Negative for dizziness and facial asymmetry.   Hematological:  Negative for adenopathy. Does  cholecystitis.  No surgery planned at this time given the cardiac issues.  The gallbladder issues may resolve with antibiotics alone.  Percutaneous cholecystostomy is also an option if she is not deemed to be an appropriate surgical candidate.      Plan:  Start PPI for GERD symptoms  Cipro 400 IV every 12  Clear liquid diet  Will follow with you    Robert Cruz MD  12/31/2023

## 2024-01-01 LAB
ANION GAP SERPL CALCULATED.3IONS-SCNC: 11 MMOL/L (ref 3–16)
ANTI-XA UNFRAC HEPARIN: 0.33 IU/ML (ref 0.3–0.7)
BUN SERPL-MCNC: 13 MG/DL (ref 7–20)
CALCIUM SERPL-MCNC: 9.3 MG/DL (ref 8.3–10.6)
CHLORIDE SERPL-SCNC: 94 MMOL/L (ref 99–110)
CO2 SERPL-SCNC: 26 MMOL/L (ref 21–32)
CREAT SERPL-MCNC: <0.5 MG/DL (ref 0.6–1.2)
DEPRECATED RDW RBC AUTO: 14.3 % (ref 12.4–15.4)
GFR SERPLBLD CREATININE-BSD FMLA CKD-EPI: >60 ML/MIN/{1.73_M2}
GLUCOSE SERPL-MCNC: 103 MG/DL (ref 70–99)
HCT VFR BLD AUTO: 42.7 % (ref 36–48)
HGB BLD-MCNC: 14.7 G/DL (ref 12–16)
MCH RBC QN AUTO: 29.7 PG (ref 26–34)
MCHC RBC AUTO-ENTMCNC: 34.4 G/DL (ref 31–36)
MCV RBC AUTO: 86.3 FL (ref 80–100)
PLATELET # BLD AUTO: 175 K/UL (ref 135–450)
PMV BLD AUTO: 7.7 FL (ref 5–10.5)
POTASSIUM SERPL-SCNC: 2.9 MMOL/L (ref 3.5–5.1)
POTASSIUM SERPL-SCNC: 3.5 MMOL/L (ref 3.5–5.1)
RBC # BLD AUTO: 4.95 M/UL (ref 4–5.2)
SODIUM SERPL-SCNC: 131 MMOL/L (ref 136–145)
WBC # BLD AUTO: 10.7 K/UL (ref 4–11)

## 2024-01-01 PROCEDURE — 99291 CRITICAL CARE FIRST HOUR: CPT | Performed by: INTERNAL MEDICINE

## 2024-01-01 PROCEDURE — 6370000000 HC RX 637 (ALT 250 FOR IP): Performed by: FAMILY MEDICINE

## 2024-01-01 PROCEDURE — 6360000002 HC RX W HCPCS: Performed by: SURGERY

## 2024-01-01 PROCEDURE — 6370000000 HC RX 637 (ALT 250 FOR IP): Performed by: INTERNAL MEDICINE

## 2024-01-01 PROCEDURE — 80048 BASIC METABOLIC PNL TOTAL CA: CPT

## 2024-01-01 PROCEDURE — C9113 INJ PANTOPRAZOLE SODIUM, VIA: HCPCS | Performed by: SURGERY

## 2024-01-01 PROCEDURE — 2580000003 HC RX 258: Performed by: FAMILY MEDICINE

## 2024-01-01 PROCEDURE — 84132 ASSAY OF SERUM POTASSIUM: CPT

## 2024-01-01 PROCEDURE — 2000000000 HC ICU R&B

## 2024-01-01 PROCEDURE — 99231 SBSQ HOSP IP/OBS SF/LOW 25: CPT | Performed by: SURGERY

## 2024-01-01 PROCEDURE — 6360000002 HC RX W HCPCS: Performed by: FAMILY MEDICINE

## 2024-01-01 PROCEDURE — 85027 COMPLETE CBC AUTOMATED: CPT

## 2024-01-01 PROCEDURE — 85520 HEPARIN ASSAY: CPT

## 2024-01-01 RX ADMIN — ASPIRIN 81 MG: 81 TABLET, CHEWABLE ORAL at 09:20

## 2024-01-01 RX ADMIN — ONDANSETRON 4 MG: 2 INJECTION INTRAMUSCULAR; INTRAVENOUS at 10:09

## 2024-01-01 RX ADMIN — POTASSIUM BICARBONATE 40 MEQ: 782 TABLET, EFFERVESCENT ORAL at 07:28

## 2024-01-01 RX ADMIN — Medication 10 ML: at 09:20

## 2024-01-01 RX ADMIN — PANTOPRAZOLE SODIUM 40 MG: 40 INJECTION, POWDER, FOR SOLUTION INTRAVENOUS at 09:20

## 2024-01-01 RX ADMIN — CIPROFLOXACIN 400 MG: 200 INJECTION, SOLUTION INTRAVENOUS at 09:31

## 2024-01-01 RX ADMIN — CIPROFLOXACIN 400 MG: 200 INJECTION, SOLUTION INTRAVENOUS at 22:13

## 2024-01-01 RX ADMIN — POTASSIUM CHLORIDE 40 MEQ: 1500 TABLET, EXTENDED RELEASE ORAL at 09:20

## 2024-01-01 RX ADMIN — ONDANSETRON 4 MG: 2 INJECTION INTRAMUSCULAR; INTRAVENOUS at 00:20

## 2024-01-01 ASSESSMENT — PAIN SCALES - GENERAL
PAINLEVEL_OUTOF10: 0

## 2024-01-01 NOTE — PROGRESS NOTES
[facility]   Progress Note  CHF/Pulmonary Hypertension Cardiology    Chief complaint:  We are following this patient for NSTEMI, possible gallbladder disease  HPI:  Jacqui Mcfadden is a 77 yo female who came in from home by EMS for abdominal pain, nausea, vomiting.  She said this developed suddenly yesterday and was present throughout the day.  She has noticed excessive burping.  Pain is localized to the mid abdomen with radiation into her back and chest.  She initially reported shortness of breath, but the pain is worse with deep breathing.  This morning she is having emesis and nausea.  No hematochezia.  Denies cough or hemoptysis.  No recent illnesses.  She has an unremarkable past medical history.  She apparently is legally blind but lives alone with daughters nearby.     Work up in the ED showed elevated and rising troponin.  CT abdomen suggested possible cholecystitis.  Heparin drip was started in the ED.  Troponin has risen from -283.  Lfts normal.  CT shows coronary artery calcification.  We are consulted for NSTEMI.           Labs:  Sodium 135  K 3.8  BUN/Cre 8/<0.5  Troponin 98, 127, 283  ALT 8, AST 16  H/H 14.0/41.3     CT abdomen:  FINDINGS:  Lower Chest: Coronary artery calcification is seen.  Bandlike opacity seen in  the lingula.  De pendant opacity is seen at the bases likely atelectasis.  Small hiatal hernia seen.  There is nonspecific thickening at the GE junction.     Organs: Calcified granuloma seen within the spleen.  No perisplenic fluid     Adrenal glands unremarkable.     Low attenuation seen in the liver, compatible with fatty infiltration.     No gallstones noted.  There is questionable gallbladder wall thickening  versus trace pericholecystic fluid.     No pancreatic calcification.  No peripancreatic fluid.     No hydronephrosis on right.  No hydronephrosis on left.  Small calcifications  seen in both the right and left kidney, either nonobstructing renal calculi  or

## 2024-01-01 NOTE — PROGRESS NOTES
Hospitalist Progress Note( late entry)    Name:  Jacqui Mcfadden    /Age/Sex: 1945  (78 y.o. female)  MRN & CSN:  [id number] & [id number]    PCP: Tammy Anderson MD    Date of Admission: 2023    Patient Status:  Inpatient     Chief Complaint:   Chief Complaint   Patient presents with    Abdominal Pain     PT to ED via [facility] from home c/o abdominal pain, lower back pain, N/V and SOB. PT is a poor historian. PT took tylenol yesterday. Zofran IV given en route.     Back Pain    Shortness of Breath    Nausea    Nausea & Vomiting       Hospital Course:      Jacqui Mcfadden is a 78 y.o. female with pmh of who presents with c/o abdominal pain , nausea, vomiting , dyspnea. The pain is radiating into her chest and back. Sx are worsened with deep inspiration. The sx started earlier today.   ED work up showed   Elevated troponin   92 serial troponin is trending up  CT abdomen showed questionable cholecysititis    Subjective:  Today is:  Hospital Day: 4.  Patient seen and examined in CVU-/2911.     Concern for the chest pain, but troponin is trending down.    Patient is having pain the left lower quadrant and a having some chest discomfort.   This pain has resolved there was some colon thickening that can likely explain this.  Family present at bedside all questions answered.          Medications:  Reviewed    Infusion Medications    nitroGLYCERIN 10 mcg/min (23 1813)    heparin (PORCINE) Infusion 14 Units/kg/hr (23 0119)    sodium chloride       Scheduled Medications    pantoprazole  40 mg IntraVENous Daily    ciprofloxacin  400 mg IntraVENous Q12H    aspirin  81 mg Oral Daily    sodium chloride flush  5-40 mL IntraVENous 2 times per day     PRN Meds: calcium carbonate, aluminum & magnesium hydroxide-simethicone, heparin (porcine), heparin (porcine), HYDROmorphone, sodium chloride flush, sodium chloride, potassium chloride **OR** potassium alternative oral replacement **OR**  contributing.  - zofran as needed.           Discussed management of the case with Cardiology  who recommended heparin gtt..    Drugs that require monitoring for toxicity include: Heparin and the method of monitoring was/is Anti-Xa    DVT ppx: Heparin  GI ppx: PPI  Diet: ADULT DIET; Clear Liquid  Code Status: Full Code    PT/OT Eval Status: when appropriate     Disposition:  She will need a cath before she discharges  Repeat trop on the am.  Heparin gtt.  32 minutes of critical care time spent        Harshil Gibson MD  1/1/2024  7:43 AM

## 2024-01-01 NOTE — PROGRESS NOTES
Hospitalist Progress Note( late entry)    Name:  Jacqui Mcfadden    /Age/Sex: 1945  (78 y.o. female)  MRN & CSN:  [id number] & [id number]    PCP: Tammy Anderson MD    Date of Admission: 2023    Patient Status:  Inpatient     Chief Complaint:   Chief Complaint   Patient presents with    Abdominal Pain     PT to ED via [facility] from home c/o abdominal pain, lower back pain, N/V and SOB. PT is a poor historian. PT took tylenol yesterday. Zofran IV given en route.     Back Pain    Shortness of Breath    Nausea    Nausea & Vomiting       Hospital Course:      Jacqui Mcfadden is a 78 y.o. female with pmh of who presents with c/o abdominal pain , nausea, vomiting , dyspnea. The pain is radiating into her chest and back. Sx are worsened with deep inspiration. The sx started earlier today.   ED work up showed   Elevated troponin   92 serial troponin is trending up  CT abdomen showed questionable cholecysititis  Started on abx  for acute cholecystitis.      Subjective:  Today is:  Hospital Day: 4.  Patient seen and examined in CVU-291/291-.     Concern for the chest pain, but troponin is trending down.    Patient is having pain the left lower quadrant and a having some chest discomfort.   This pain has resolved there was some colon thickening that can likely explain this.  Family present at bedside all questions answered.          Medications:  Reviewed    Infusion Medications    nitroGLYCERIN Stopped (24)    heparin (PORCINE) Infusion 14 Units/kg/hr (24)    sodium chloride       Scheduled Medications    pantoprazole  40 mg IntraVENous Daily    ciprofloxacin  400 mg IntraVENous Q12H    aspirin  81 mg Oral Daily    sodium chloride flush  5-40 mL IntraVENous 2 times per day     PRN Meds: calcium carbonate, aluminum & magnesium hydroxide-simethicone, heparin (porcine), heparin (porcine), HYDROmorphone, sodium chloride flush, sodium chloride, potassium chloride **OR** potassium

## 2024-01-01 NOTE — PROGRESS NOTES
Jacqui Mcfadden is a 78 y.o. female patient.    CC-upper abdominal pain    COF-78-[place]-old female seen in follow-up for upper abdominal pain  Current Facility-Administered Medications   Medication Dose Route Frequency Provider Last Rate Last Admin    pantoprazole (PROTONIX) injection 40 mg  40 mg IntraVENous Daily Robert Cruz MD   40 mg at 01/01/24 0920    ciprofloxacin (CIPRO) IVPB 400 mg  400 mg IntraVENous Q12H Robert Cruz MD   Stopped at 01/01/24 1031    aspirin chewable tablet 81 mg  81 mg Oral Daily Jacqui Connor MD   81 mg at 01/01/24 0920    nitroGLYCERIN 200 mcg/mL in dextrose 5%  5-200 mcg/min IntraVENous Continuous Jacqui Connor MD 3 mL/hr at 12/30/23 1813 10 mcg/min at 12/30/23 1813    calcium carbonate (TUMS) chewable tablet 500 mg  500 mg Oral TID PRN Harshil Gibson MD   500 mg at 12/31/23 1216    aluminum & magnesium hydroxide-simethicone (MAALOX) 200-200-20 MG/5ML suspension 30 mL  30 mL Oral Q6H PRN Harshil Gibson MD   30 mL at 12/30/23 1103    heparin (porcine) injection 3,920 Units  60 Units/kg IntraVENous PRN Kenton Araya MD        heparin (porcine) injection 1,960 Units  30 Units/kg IntraVENous PRN Kenton Araya MD   1,960 Units at 12/30/23 0937    heparin 25,000 units in dextrose 5% 250 mL (premix) infusion  5-30 Units/kg/hr IntraVENous Continuous Amber Weinberg MD 9.1 mL/hr at 12/31/23 0119 14 Units/kg/hr at 12/31/23 0119    HYDROmorphone HCl PF (DILAUDID) injection 0.25 mg  0.25 mg IntraVENous Q4H PRN Amber Weinberg MD   0.25 mg at 12/29/23 2343    sodium chloride flush 0.9 % injection 5-40 mL  5-40 mL IntraVENous 2 times per day Amber Weinebrg MD   10 mL at 01/01/24 0920    sodium chloride flush 0.9 % injection 5-40 mL  5-40 mL IntraVENous PRN Amber Weinberg MD        0.9 % sodium chloride infusion   IntraVENous PRN Amber Weinberg MD        potassium chloride (KLOR-CON M) extended release tablet 40 mEq  40 mEq Oral PRN Amber Weinberg MD   40 mEq at

## 2024-01-02 DIAGNOSIS — I25.10 CORONARY ARTERY DISEASE DUE TO LIPID RICH PLAQUE: Primary | ICD-10-CM

## 2024-01-02 DIAGNOSIS — I25.83 CORONARY ARTERY DISEASE DUE TO LIPID RICH PLAQUE: Primary | ICD-10-CM

## 2024-01-02 LAB
ANTI-XA UNFRAC HEPARIN: 0.34 IU/ML (ref 0.3–0.7)
POTASSIUM SERPL-SCNC: 3.7 MMOL/L (ref 3.5–5.1)

## 2024-01-02 PROCEDURE — 99152 MOD SED SAME PHYS/QHP 5/>YRS: CPT | Performed by: INTERNAL MEDICINE

## 2024-01-02 PROCEDURE — 93458 L HRT ARTERY/VENTRICLE ANGIO: CPT

## 2024-01-02 PROCEDURE — 85520 HEPARIN ASSAY: CPT

## 2024-01-02 PROCEDURE — 99152 MOD SED SAME PHYS/QHP 5/>YRS: CPT

## 2024-01-02 PROCEDURE — 6360000002 HC RX W HCPCS: Performed by: FAMILY MEDICINE

## 2024-01-02 PROCEDURE — 2000000000 HC ICU R&B

## 2024-01-02 PROCEDURE — C1894 INTRO/SHEATH, NON-LASER: HCPCS

## 2024-01-02 PROCEDURE — 99232 SBSQ HOSP IP/OBS MODERATE 35: CPT | Performed by: INTERNAL MEDICINE

## 2024-01-02 PROCEDURE — 6370000000 HC RX 637 (ALT 250 FOR IP): Performed by: INTERNAL MEDICINE

## 2024-01-02 PROCEDURE — 2500000003 HC RX 250 WO HCPCS

## 2024-01-02 PROCEDURE — 6360000004 HC RX CONTRAST MEDICATION: Performed by: INTERNAL MEDICINE

## 2024-01-02 PROCEDURE — 99231 SBSQ HOSP IP/OBS SF/LOW 25: CPT | Performed by: SURGERY

## 2024-01-02 PROCEDURE — B2111ZZ FLUOROSCOPY OF MULTIPLE CORONARY ARTERIES USING LOW OSMOLAR CONTRAST: ICD-10-PCS | Performed by: INTERNAL MEDICINE

## 2024-01-02 PROCEDURE — 2709999900 HC NON-CHARGEABLE SUPPLY

## 2024-01-02 PROCEDURE — C1874 STENT, COATED/COV W/DEL SYS: HCPCS

## 2024-01-02 PROCEDURE — 2580000003 HC RX 258: Performed by: INTERNAL MEDICINE

## 2024-01-02 PROCEDURE — 84132 ASSAY OF SERUM POTASSIUM: CPT

## 2024-01-02 PROCEDURE — 6360000002 HC RX W HCPCS

## 2024-01-02 PROCEDURE — 027135Z DILATION OF CORONARY ARTERY, TWO ARTERIES WITH TWO DRUG-ELUTING INTRALUMINAL DEVICES, PERCUTANEOUS APPROACH: ICD-10-PCS | Performed by: INTERNAL MEDICINE

## 2024-01-02 PROCEDURE — 92928 PRQ TCAT PLMT NTRAC ST 1 LES: CPT | Performed by: INTERNAL MEDICINE

## 2024-01-02 PROCEDURE — 027137Z DILATION OF CORONARY ARTERY, TWO ARTERIES WITH FOUR OR MORE DRUG-ELUTING INTRALUMINAL DEVICES, PERCUTANEOUS APPROACH: ICD-10-PCS | Performed by: INTERNAL MEDICINE

## 2024-01-02 PROCEDURE — 2580000003 HC RX 258

## 2024-01-02 PROCEDURE — 99153 MOD SED SAME PHYS/QHP EA: CPT

## 2024-01-02 PROCEDURE — APPNB30 APP NON BILLABLE TIME 0-30 MINS: Performed by: NURSE PRACTITIONER

## 2024-01-02 PROCEDURE — C1769 GUIDE WIRE: HCPCS

## 2024-01-02 PROCEDURE — C1725 CATH, TRANSLUMIN NON-LASER: HCPCS

## 2024-01-02 PROCEDURE — C1887 CATHETER, GUIDING: HCPCS

## 2024-01-02 PROCEDURE — C9600 PERC DRUG-EL COR STENT SING: HCPCS

## 2024-01-02 PROCEDURE — 6370000000 HC RX 637 (ALT 250 FOR IP)

## 2024-01-02 PROCEDURE — 2580000003 HC RX 258: Performed by: FAMILY MEDICINE

## 2024-01-02 PROCEDURE — APPSS15 APP SPLIT SHARED TIME 0-15 MINUTES: Performed by: NURSE PRACTITIONER

## 2024-01-02 PROCEDURE — 4A023N7 MEASUREMENT OF CARDIAC SAMPLING AND PRESSURE, LEFT HEART, PERCUTANEOUS APPROACH: ICD-10-PCS | Performed by: INTERNAL MEDICINE

## 2024-01-02 PROCEDURE — 6360000002 HC RX W HCPCS: Performed by: SURGERY

## 2024-01-02 PROCEDURE — 6370000000 HC RX 637 (ALT 250 FOR IP): Performed by: FAMILY MEDICINE

## 2024-01-02 PROCEDURE — 6360000002 HC RX W HCPCS: Performed by: INTERNAL MEDICINE

## 2024-01-02 PROCEDURE — C9113 INJ PANTOPRAZOLE SODIUM, VIA: HCPCS | Performed by: SURGERY

## 2024-01-02 PROCEDURE — 93458 L HRT ARTERY/VENTRICLE ANGIO: CPT | Performed by: INTERNAL MEDICINE

## 2024-01-02 RX ORDER — CLOPIDOGREL BISULFATE 75 MG/1
75 TABLET ORAL DAILY
Status: DISCONTINUED | OUTPATIENT
Start: 2024-01-02 | End: 2024-01-03 | Stop reason: HOSPADM

## 2024-01-02 RX ORDER — ONDANSETRON 2 MG/ML
4 INJECTION INTRAMUSCULAR; INTRAVENOUS EVERY 6 HOURS PRN
Status: DISCONTINUED | OUTPATIENT
Start: 2024-01-02 | End: 2024-01-02 | Stop reason: SDUPTHER

## 2024-01-02 RX ORDER — ATORVASTATIN CALCIUM 80 MG/1
80 TABLET, FILM COATED ORAL NIGHTLY
Status: DISCONTINUED | OUTPATIENT
Start: 2024-01-02 | End: 2024-01-03 | Stop reason: HOSPADM

## 2024-01-02 RX ORDER — ASPIRIN 81 MG/1
81 TABLET, CHEWABLE ORAL DAILY
Status: DISCONTINUED | OUTPATIENT
Start: 2024-01-03 | End: 2024-01-03 | Stop reason: HOSPADM

## 2024-01-02 RX ADMIN — Medication 10 ML: at 19:48

## 2024-01-02 RX ADMIN — IOPAMIDOL 160 ML: 755 INJECTION, SOLUTION INTRAVENOUS at 10:09

## 2024-01-02 RX ADMIN — POTASSIUM CHLORIDE 40 MEQ: 1500 TABLET, EXTENDED RELEASE ORAL at 00:31

## 2024-01-02 RX ADMIN — BIVALIRUDIN 1.75 MG/KG/HR: 250 INJECTION, POWDER, LYOPHILIZED, FOR SOLUTION INTRAVENOUS at 10:51

## 2024-01-02 RX ADMIN — PANTOPRAZOLE SODIUM 40 MG: 40 INJECTION, POWDER, FOR SOLUTION INTRAVENOUS at 08:03

## 2024-01-02 RX ADMIN — Medication 10 ML: at 08:03

## 2024-01-02 RX ADMIN — CIPROFLOXACIN 400 MG: 200 INJECTION, SOLUTION INTRAVENOUS at 10:36

## 2024-01-02 RX ADMIN — ACETAMINOPHEN 650 MG: 325 TABLET ORAL at 19:47

## 2024-01-02 RX ADMIN — CIPROFLOXACIN 400 MG: 200 INJECTION, SOLUTION INTRAVENOUS at 20:41

## 2024-01-02 RX ADMIN — ATORVASTATIN CALCIUM 80 MG: 80 TABLET, FILM COATED ORAL at 19:47

## 2024-01-02 RX ADMIN — ASPIRIN 81 MG: 81 TABLET, CHEWABLE ORAL at 08:03

## 2024-01-02 RX ADMIN — CLOPIDOGREL BISULFATE 75 MG: 75 TABLET ORAL at 18:17

## 2024-01-02 RX ADMIN — ONDANSETRON 4 MG: 2 INJECTION INTRAMUSCULAR; INTRAVENOUS at 19:48

## 2024-01-02 ASSESSMENT — PAIN SCALES - GENERAL
PAINLEVEL_OUTOF10: 0
PAINLEVEL_OUTOF10: 0

## 2024-01-02 NOTE — PROGRESS NOTES
PATIENT HISTORY    ECHO: DATE: 12/29/2023       EF:  55-60%  STRESS TEST PREFORMED:  No  EKG: Yes    ECG     Result: Normal  Pre CATH Rhythm: Normal Sinus Rhythm  HYPERTENSION: No  DYSLIPIDEMIA: No  FAMILY HX OF CAD: No  PRIOR MI: No  PRIOR PCI: No  PRIOR CABG: No  CEREBROVASCULAR DX: No  PERIPHERAL ARTERIAL DISEASE: No  CHRONIC LUNG DISEASE: No  TOBACCO: Cigarettes >10 a Day  DIABETIC: No  CARDIAC ARREST: {No  DIALYSIS: No  HEART FAILURE: No  FRAILTY SCORE: 3 MANAGING WELL (medical problems are well controlled, not regularly active beyond routine walking)  CARDIAC CTA PREFORMED:  No  [place]ST[place] CORONARY CALCIUM SCORE:   Assessed: No  Prior Diagnostic Coronary Angioplasty Procedure:  No

## 2024-01-02 NOTE — PROGRESS NOTES
Angiomax stopped at 1219. Patient instructed on removal procedure.  Arterial sheath site without hematoma or oozing. Arterial sheath removed per policy without difficulty. Integrity of sheath inspected upon removal and no abnormalities noted.  Manual pressure held X 20 minutes.  Dry, sterile tegaderm applied.  Pressure dressing applied.  Patient tolerated well.  Vital signs, groin checks, and pedal pulses will be completed per protocol (every 15 minutes X 4, every 30 minutes X 2, and every hour X 2 per protocol).    Yee Díaz RN

## 2024-01-02 NOTE — PROGRESS NOTES
Pt back from ccl. Stable. Groin and radial site wnl at current. Spoke with daughter on phone updated. Pt aware of s/s of bleeding and precautions in place. Verbalizes understanding of not able to move limb or sit up

## 2024-01-02 NOTE — PLAN OF CARE
Problem: Pain  Goal: Verbalizes/displays adequate comfort level or baseline comfort level  Outcome: Progressing  Flowsheets (Taken 1/1/2024 2007)  Verbalizes/displays adequate comfort level or baseline comfort level:   Encourage patient to monitor pain and request assistance   Assess pain using appropriate pain scale   Administer analgesics based on type and severity of pain and evaluate response   Implement non-pharmacological measures as appropriate and evaluate response   Consider cultural and social influences on pain and pain management   Notify Licensed Independent Practitioner if interventions unsuccessful or patient reports new pain     Problem: Safety - Adult  Goal: Free from fall injury  Outcome: Progressing  Flowsheets (Taken 1/2/2024 0533)  Free From Fall Injury:   Instruct family/caregiver on patient safety   Based on caregiver fall risk screen, instruct family/caregiver to ask for assistance with transferring infant if caregiver noted to have fall risk factors

## 2024-01-02 NOTE — OP NOTE
[facility] Operative Note     PROCEDURE SUMMARY   Procedure C, PCI LAD, PCI RCA   Indication NSTEMI   Consent Obtained   Access RRA, RCFA   US US guidance used to determine artery patency, size (>2mm), anatomic variations and ideal puncture location.  Real-time US utilized concurrent with vascular needle entry into artery.  Image(s) permanently recorded and reported in chart.   Bleed Risk Low   Sedation Minimal conscious sedation for comfort.  Independent trained observer pushed medications at my direction. Level of consciousness and vital signs/physiologic status monitored throughout the procedure (see start and stop times above, as well as medication dosages).   Start Time 0830   Stop Time 1003   Versed 3mg   Fentanyl 100mcg   Contrast 160cc   Flouro 19min   EBL <20mL   Complicat None   Specimens None   Procedure Detail Patient taken to cath lab in postabsorptive state, informed consent obtained.  RRA and RCFA prepped and draped in normal sterile fashion  Micro needle used to access artery With US guidance  Micro wire advanced into artery and 4/5 Slender sheath advanced over wire   JL 3.5 advanced over wire to engage LM coronary artery and image in multiple views  JL 3.5 exchanged over a wire for JR4 to engage RCA and image in multiple views  JR4 removed over J wire  Arterial hemostasis obtained with Radial band and Manual pressure  Decision made to intervene on RCA and LAD  XBLAD 3.5 guide engaged LM, runthrough wire advanced  Lesion predilated and stented with Xience 2.5X38 opw 3.0X38 opw 3.25X18  LAD PD with 2.75 NC distally  JR4 guide then advanced to RCA, runthrough advanced distally  Lesion predilated then stented with Xience 3.5X38 opw 3.5X18, all PD 3.5NC     FINDINGS   Artery Findings   LM Normal   LAD 90% prox, 80% mid, 95% mid-distal   Cx 50% mid   RI 40% ostial to proximal   RCA 90% prox, 80% mid   LVEDP 1mmHg Normal 3-12mmHg   LVG N/A  Normal >/= 55%   AVG Normal     INTERVENTION(S)

## 2024-01-02 NOTE — PRE SEDATION
[facility]  Pre-sedation Assessment   PROCEDURE   Planned Procedure Cardiac catheterization   Consent I have discussed with the patient and/or the patient representative the indication, alternatives, and the possible risks and/or complications of the planned procedure and the anesthesia methods. The patient and/or patient representative appear to understand and agree to proceed.   INDICATION   Chief Complaint Anginal Equivalent  NSTEMI   Presentation New Onset Angina <= 2 months and Suspected CAD   Anginal Class (2 wks) CCS III - Symptoms with everyday living activities, i.e., moderate limitation   Anginal Symptoms Typical chest pain or anginal equivalent   CHF Class (2 wks) No symptoms   CV Instability Yes   ISCHEMIC WORKUP/MANAGEMENT/EVALUTION   Stress Test No   Anginal Meds Yes: Aspirin   UPCOMING SURGERY   Valve surgery No   MEDICAL HISTORY   Vital Signs BP 99/63   Pulse 71   Temp 97.2 °F (36.2 °C) (Temporal)   Resp 16   Ht 1.575 m (5' 2\")   Wt 57.6 kg (126 lb 15.8 oz)   SpO2 95%   BMI 23.23 kg/m²    Allergies Reviewed in EMR   Medications Reviewed in EMR   Medical History Reviewed in EMR   Surgical History Reviewed in EMR   PRE-SEDATION   Exam I have assessed the patient and reviewed the H&P on the chart.   Hx Anesthesia Issue None   Mod Mallampati II   ASA Class Class 2 - A normal healthy patient with mild systemic disease   BRITANY SCALE   Activity 2 - Able to move 4 extrem on command   Respiration 2 - Able to breath deeply and cough freely   Circulation 2 - BP +/- 20mmHg of normal   Consciousness 2 - Fully awake   Oxygen Saturation 2 - Able to maintain oxygen sat >92% on room air   SEDATION/ANESTHESIA PLAN   Goal Guard patient safety and welfare. Minimize physical discomfort and pain. Minimize negative psychological responses to treatment by providing sedation and analgesia and maximize the potential amnesia.  Patient to meet pre-procedure discharge plan.   Medications Midazolam  intravenously and fentanyl intravenously   Appropriate Candidate Yes   Post Procedure Return to same level of care

## 2024-01-02 NOTE — PROGRESS NOTES
[facility]        Progress Note    Patient Name: Jacqui Mcfadden  MRN: [id number]  YOB: 1945  Date of Evaluation: 2024    Chief Complaint: Abdominal pain    Subjective:  No acute events overnight  Pain significantly improved, none at rest  No nausea or vomiting  Resting in bed at this time      Vital Signs:  Patient Vitals for the past 24 hrs:   BP Temp Temp src Pulse Resp SpO2 Weight   24 1330 -- -- -- 88 -- -- --   24 1315 -- -- -- 76 -- -- --   24 1300 -- -- -- 81 -- -- --   24 1245 -- -- -- 76 -- -- --   24 1230 -- -- -- 92 -- -- --   24 1215 -- -- -- 88 -- -- --   24 1200 -- -- -- 82 -- -- --   24 1145 -- -- -- 71 -- -- --   24 1130 -- -- -- 72 -- -- --   24 1115 -- -- -- 80 -- -- --   24 1100 -- -- -- 73 -- -- --   24 1045 -- -- -- 75 -- -- --   24 1030 -- -- -- 68 -- 96 % --   24 0802 (!) 99/54 97.8 °F (36.6 °C) -- 79 -- 96 % --   24 0605 -- -- -- -- -- -- 57.6 kg (126 lb 15.8 oz)   24 0303 99/63 97.2 °F (36.2 °C) Temporal 71 16 95 % --   24 002 104/65 97.6 °F (36.4 °C) Temporal (!) 102 18 98 % --   24 90/70 97.9 °F (36.6 °C) Temporal (!) 104 16 97 % --   24 1700 (!) 124/111 -- -- 94 -- -- --   24 1600 115/73 98 °F (36.7 °C) Temporal 84 18 96 % --      TEMPERATURE HISTORY 24H: Temp (24hrs), Av.7 °F (36.5 °C), Min:97.2 °F (36.2 °C), Max:98 °F (36.7 °C)    BLOOD PRESSURE HISTORY: Systolic (36hrs), Av , Min:90 , Max:139    Diastolic (36hrs), Av, Min:54, Max:111      Intake/Output:  I/O last 3 completed shifts:  In: 867.3 [I.V.:563; IV Piggyback:304.3]  Out: -   I/O this shift:  In: 499.9 [I.V.:124.6; IV Piggyback:375.3]  Out: 300 [Urine:300]  Drain/tube Output:       Physical Exam:  General: awake, alert, oriented to  person, place, time  Cardiovascular:  regular rate and rhythm and no murmur noted  Lungs: clear

## 2024-01-02 NOTE — PROGRESS NOTES
Educated patient and/or family on the importance of attending Cardiac Rehab post procedure. Educational flyer provided. Cardiology RN notified to place outpatient orders.

## 2024-01-02 NOTE — PLAN OF CARE
Problem: Safety - Adult  Goal: Free from fall injury  1/2/2024 0739 by Romi Lees, RN  Outcome: Progressing   Fall precautions in place as pt forgetful and on heparin drip.

## 2024-01-02 NOTE — PROGRESS NOTES
[facility]  H+P  Consult  OP Visit  FU Visit   CC/INTERVAL HX   Admit 12/29/2023   CC Elevated troponin   Subjective Doing well this am.  No cp, sob.   Tele Reviewed available   EXAM   VS BP 99/63   Pulse 71   Temp 97.2 °F (36.2 °C) (Temporal)   Resp 16   Ht 1.575 m (5' 2\")   Wt 57.6 kg (126 lb 15.8 oz)   SpO2 95%   BMI 23.23 kg/m²    Gen Alert, coop, no distress Heart  RRR, no MRG   Head NC, AT, no abnorm Abd  Soft, NT, +BS, no mass, no OM   Eyes PER, conj/corn clear Ext  Ext nl, AT, no C/C/E   Nose Nares nl, no drain, NT Pulse 2+ and symmetric   Throat Lips, mucosa, tongue nl Skin Col/text/turg nl, no vis rash/les   Neck S/S, TM, NT, no bruit/JVD Psych Nl mood and affect   Lung CTA-B, unlabored, no DTP Lymph   No cervical or axillary LA   Ch wall NT, no deform Neuro  Nl gross M/S exam      MEDICATIONS    pantoprazole  40 mg IntraVENous Daily    ciprofloxacin  400 mg IntraVENous Q12H    aspirin  81 mg Oral Daily    sodium chloride flush  5-40 mL IntraVENous 2 times per day      nitroGLYCERIN Stopped (01/01/24 1823)    heparin (PORCINE) Infusion 14 Units/kg/hr (01/01/24 1824)    sodium chloride        LABS   Hgb Lab Results   Component Value Date    HGB 14.7 01/01/2024      Cr Lab Results   Component Value Date    CREATININE <0.5 (L) 01/01/2024      Trop Lab Results   Component Value Date    TROPHS 111 (H) 12/31/2023    TROPHS 193 (H) 12/30/2023    TROPHS 283 (H) 12/30/2023       ASSESSMENT TIME   More than 35 minutes spent reviewing patient chart (including but not limited to notes, labs, imaging and other testing), interviewing patient and/or family members, performing a physical exam, documentation of my findings above and subsequent follow-up of ordered testing.  More than 50% of that time spent face to face with patient discussing clinical condition and counseling regarding treatment plan.     ASSESSMENT AND PLAN   *NSTEMI   Date EF Results   Sx   Stable, as detailed above   TTE 12/23 60%

## 2024-01-03 VITALS
SYSTOLIC BLOOD PRESSURE: 92 MMHG | HEIGHT: 62 IN | BODY MASS INDEX: 22.41 KG/M2 | OXYGEN SATURATION: 96 % | WEIGHT: 121.8 LBS | DIASTOLIC BLOOD PRESSURE: 61 MMHG | TEMPERATURE: 97.6 F | RESPIRATION RATE: 14 BRPM | HEART RATE: 74 BPM

## 2024-01-03 LAB
ANION GAP SERPL CALCULATED.3IONS-SCNC: 10 MMOL/L (ref 3–16)
BACTERIA BLD CULT ORG #2: NORMAL
BACTERIA BLD CULT: NORMAL
BUN SERPL-MCNC: 11 MG/DL (ref 7–20)
CALCIUM SERPL-MCNC: 8.7 MG/DL (ref 8.3–10.6)
CHLORIDE SERPL-SCNC: 97 MMOL/L (ref 99–110)
CHOLEST SERPL-MCNC: 163 MG/DL (ref 0–199)
CO2 SERPL-SCNC: 24 MMOL/L (ref 21–32)
CREAT SERPL-MCNC: <0.5 MG/DL (ref 0.6–1.2)
DEPRECATED RDW RBC AUTO: 14.1 % (ref 12.4–15.4)
GFR SERPLBLD CREATININE-BSD FMLA CKD-EPI: >60 ML/MIN/{1.73_M2}
GLUCOSE SERPL-MCNC: 92 MG/DL (ref 70–99)
HCT VFR BLD AUTO: 38.7 % (ref 36–48)
HDLC SERPL-MCNC: 60 MG/DL (ref 40–60)
HGB BLD-MCNC: 13.3 G/DL (ref 12–16)
LDLC SERPL CALC-MCNC: 86 MG/DL
MCH RBC QN AUTO: 29.4 PG (ref 26–34)
MCHC RBC AUTO-ENTMCNC: 34.2 G/DL (ref 31–36)
MCV RBC AUTO: 85.9 FL (ref 80–100)
PLATELET # BLD AUTO: 161 K/UL (ref 135–450)
PMV BLD AUTO: 7.9 FL (ref 5–10.5)
POTASSIUM SERPL-SCNC: 4 MMOL/L (ref 3.5–5.1)
RBC # BLD AUTO: 4.51 M/UL (ref 4–5.2)
SODIUM SERPL-SCNC: 131 MMOL/L (ref 136–145)
TRIGL SERPL-MCNC: 85 MG/DL (ref 0–150)
VLDLC SERPL CALC-MCNC: 17 MG/DL
WBC # BLD AUTO: 7.7 K/UL (ref 4–11)

## 2024-01-03 PROCEDURE — 80061 LIPID PANEL: CPT

## 2024-01-03 PROCEDURE — 6370000000 HC RX 637 (ALT 250 FOR IP): Performed by: INTERNAL MEDICINE

## 2024-01-03 PROCEDURE — 80048 BASIC METABOLIC PNL TOTAL CA: CPT

## 2024-01-03 PROCEDURE — C9113 INJ PANTOPRAZOLE SODIUM, VIA: HCPCS | Performed by: SURGERY

## 2024-01-03 PROCEDURE — 85027 COMPLETE CBC AUTOMATED: CPT

## 2024-01-03 PROCEDURE — APPNB30 APP NON BILLABLE TIME 0-30 MINS: Performed by: NURSE PRACTITIONER

## 2024-01-03 PROCEDURE — APPSS15 APP SPLIT SHARED TIME 0-15 MINUTES: Performed by: NURSE PRACTITIONER

## 2024-01-03 PROCEDURE — 6360000002 HC RX W HCPCS: Performed by: SURGERY

## 2024-01-03 PROCEDURE — 99232 SBSQ HOSP IP/OBS MODERATE 35: CPT | Performed by: INTERNAL MEDICINE

## 2024-01-03 PROCEDURE — 99231 SBSQ HOSP IP/OBS SF/LOW 25: CPT | Performed by: SURGERY

## 2024-01-03 RX ORDER — ATORVASTATIN CALCIUM 80 MG/1
80 TABLET, FILM COATED ORAL NIGHTLY
Qty: 30 TABLET | Refills: 3 | Status: SHIPPED | OUTPATIENT
Start: 2024-01-03

## 2024-01-03 RX ORDER — ASPIRIN 81 MG/1
81 TABLET, CHEWABLE ORAL DAILY
Qty: 30 TABLET | Refills: 3 | Status: SHIPPED | OUTPATIENT
Start: 2024-01-04

## 2024-01-03 RX ORDER — CIPROFLOXACIN 500 MG/1
500 TABLET, FILM COATED ORAL 2 TIMES DAILY
Qty: 8 TABLET | Refills: 0 | Status: SHIPPED | OUTPATIENT
Start: 2024-01-03 | End: 2024-01-07

## 2024-01-03 RX ORDER — CLOPIDOGREL BISULFATE 75 MG/1
75 TABLET ORAL DAILY
Qty: 30 TABLET | Refills: 3 | Status: SHIPPED | OUTPATIENT
Start: 2024-01-04

## 2024-01-03 RX ORDER — ENOXAPARIN SODIUM 100 MG/ML
40 INJECTION SUBCUTANEOUS DAILY
Status: DISCONTINUED | OUTPATIENT
Start: 2024-01-03 | End: 2024-01-03 | Stop reason: HOSPADM

## 2024-01-03 RX ADMIN — CLOPIDOGREL BISULFATE 75 MG: 75 TABLET ORAL at 09:58

## 2024-01-03 RX ADMIN — CIPROFLOXACIN 400 MG: 200 INJECTION, SOLUTION INTRAVENOUS at 09:59

## 2024-01-03 RX ADMIN — PANTOPRAZOLE SODIUM 40 MG: 40 INJECTION, POWDER, FOR SOLUTION INTRAVENOUS at 09:58

## 2024-01-03 RX ADMIN — ASPIRIN 81 MG: 81 TABLET, CHEWABLE ORAL at 09:58

## 2024-01-03 NOTE — DISCHARGE INSTRUCTIONS
Follow up with your PCP within 2-3 days of discharge.  Have PCP check serum sodium level  Follow up with general surgery in 1 week  Follow up with cardiology in 2 weeks  Take all your medications as prescribed.    Post Angiogram/ Intervention Discharge Instructions      Do you have the help you need at home?        Activity:  You may drive in 24hrs unless instructed by your doctor   Resume normal activity in one week  Avoid lifting, pushing, or pulling more than 10 lbs. For one week. (A gallon of milk is 7 lbs)  Limit stair climbing to once a day for two weeks.  You may walk at an easy pace on ground level.  Plan rest periods during the day.  Avoid tension and stress.  Learn to manage your stress.  Avoid work that increases muscle tension.        (straining with bowel movements, moving furniture)    Wound Care:  You may shower, but no bathtubs, pools, or hot tubs for one week.  Inspect area daily.  Normal observations:  Soreness and tenderness that may last for one week, mild pink to red oozing from incision site for up to 24 hrs after discharge,    bruising that could last up to two weeks.  Clean with soap and water.  NO lotion or powder.  Dry area thoroughly.  Apply band    aide for 48 hrs.    Nutrition:   Low fat, low salt diet (guidelines for Heart Healthy eating)  Limit caffeine to 1-2 cups per day (coffee, tea, chocolate, soda)  Eat high fiber to avoid constipation and straining during bowel movements (fresh veggies and fruit, whole grain)  Limit alcohol to two servings a day ( 8 oz beer, 1 oz liquor, 4 oz wine )  Drink at least 8 to 10 glasses of decaffeinated, non-alcoholic fluid for the next 24 hours to flush the x-ray dye used for your angiogram out of your body.     Depression:  It is not unusual to have feelings of anxiety, fear, or depression after your procedure.  If you need help with these feelings, call your primary care physician.  There are medications to help you and healing usually occurs sooner

## 2024-01-03 NOTE — PROGRESS NOTES
[facility]        Progress Note    Patient Name: Jacqui Mcfadden  MRN: [id number]  YOB: 1945  Date of Evaluation: 1/3/2024    Chief Complaint: Abdominal pain    Subjective:  No acute events overnight  Abdominal pain resolved  No nausea or vomiting but poor intake, only able to tolerate tea, reports all other hospital food tastes bad but believes she will be able to eat at home  Passing flatus and stool  Resting in bed at this time      Vital Signs:  Patient Vitals for the past 24 hrs:   BP Temp Temp src Pulse Resp SpO2 Weight   01/03/24 0900 92/61 -- -- 74 -- -- --   01/03/24 0800 (!) 97/59 -- -- 77 -- -- --   01/03/24 0700 (!) 85/72 -- -- 82 -- -- --   01/03/24 0422 100/65 97.6 °F (36.4 °C) Temporal 81 14 96 % 55.2 kg (121 lb 12.8 oz)   01/03/24 0000 100/66 97.6 °F (36.4 °C) Temporal 83 14 95 % --   01/02/24 2200 105/69 -- -- 78 -- -- --   01/02/24 2100 97/67 -- -- 71 -- -- --   01/02/24 2000 96/82 97.5 °F (36.4 °C) Temporal 99 16 99 % --   01/02/24 1830 110/64 -- -- 76 -- -- --   01/02/24 1815 (!) 108/95 -- -- 79 -- -- --   01/02/24 1800 117/76 -- -- 82 -- -- --   01/02/24 1745 111/74 -- -- 74 -- -- --   01/02/24 1730 106/64 -- -- 74 -- -- --   01/02/24 1715 109/79 -- -- 80 -- -- --   01/02/24 1700 107/70 -- -- 79 -- -- --   01/02/24 1645 (!) 99/57 -- -- 82 -- -- --   01/02/24 1630 104/73 -- -- 94 -- 97 % --   01/02/24 1626 107/63 -- -- 79 -- 96 % --   01/02/24 1615 97/63 -- -- 76 -- 96 % --   01/02/24 1611 (!) 87/67 -- -- 84 -- 98 % --   01/02/24 1608 112/63 -- -- 88 -- 99 % --   01/02/24 1605 -- -- -- 78 -- 97 % --   01/02/24 1600 -- -- -- 85 -- 96 % --   01/02/24 1545 -- -- -- 82 -- -- --   01/02/24 1530 -- -- -- 77 -- -- --   01/02/24 1515 -- -- -- 82 -- -- --   01/02/24 1500 -- -- -- 80 -- -- --   01/02/24 1445 -- -- -- 81 -- -- --   01/02/24 1430 -- -- -- (!) 110 -- -- --   01/02/24 1415 -- -- -- 93 -- -- --   01/02/24 1400 -- -- -- 85 -- -- --  12/29/2023 06:37 PM    INR 0.96 12/29/2023 06:37 PM    APTT 25.6 12/29/2023 06:37 PM       Cultures:  Anaerobic culture  No results found for: \"LABANAE\"  Fungus stain  No results found for requested labs within last 30 days.     Gram stain  No results found for requested labs within last 30 days.     Organism  No results found for: \"ORG\"  Surgical culture  No results found for: \"CXSURG\"  Blood culture 1  Results in Past 30 Days  Result Component Current Result Ref Range Previous Result Ref Range   Blood Culture, Routine No Growth to date.  Any change in status will be called. (12/30/2023)  Not in Time Range      Blood culture 2  Results in Past 30 Days  Result Component Current Result Ref Range Previous Result Ref Range   Culture, Blood 2 No Growth to date.  Any change in status will be called. (12/30/2023)  Not in Time Range      Fecal occult  No results found for requested labs within last 30 days.     GI bacterial pathogens by PCR  No results found for requested labs within last 30 days.     C. difficile  No results found for requested labs within last 30 days.     Urine culture  No results found for: \"LABURIN\"    Pathology:  No relevant pathology     Imaging:  I have personally reviewed the following films:    No results found.    Scheduled Meds:   aspirin  81 mg Oral Daily    clopidogrel  75 mg Oral Daily    atorvastatin  80 mg Oral Nightly    pantoprazole  40 mg IntraVENous Daily    ciprofloxacin  400 mg IntraVENous Q12H    sodium chloride flush  5-40 mL IntraVENous 2 times per day     Continuous Infusions:   sodium chloride       PRN Meds:.perflutren lipid microspheres, calcium carbonate, aluminum & magnesium hydroxide-simethicone, HYDROmorphone, sodium chloride flush, sodium chloride, potassium chloride **OR** potassium alternative oral replacement **OR** potassium chloride, magnesium sulfate, ondansetron **OR** ondansetron, polyethylene glycol, acetaminophen **OR** acetaminophen      Assessment:  78 y.o.

## 2024-01-03 NOTE — PROGRESS NOTES
Hospitalist Progress Note( late entry)    Name:  Jacqui Mcfadden    /Age/Sex: 1945  (78 y.o. female)  MRN & CSN:  [id number] & [id number]    PCP: Tammy Anderson MD    Date of Admission: 2023    Patient Status:  Inpatient     Chief Complaint:   Chief Complaint   Patient presents with    Abdominal Pain     PT to ED via [facility] from home c/o abdominal pain, lower back pain, N/V and SOB. PT is a poor historian. PT took tylenol yesterday. Zofran IV given en route.     Back Pain    Shortness of Breath    Nausea    Nausea & Vomiting       Hospital Course:      Jacqui Mcfadden is a 78 y.o. female with pmh of who presents with c/o abdominal pain , nausea, vomiting , dyspnea. The pain is radiating into her chest and back. Sx are worsened with deep inspiration. The sx started earlier today.   ED work up showed   Elevated troponin   92 serial troponin is trending up  CT abdomen showed questionable cholecysititis  Started on abx  for acute cholecystitis.      Subjective:  Today is:  Hospital Day: 5.  Patient seen and examined in CVU-291/291-.     Concern for the chest pain, but troponin is trending down.    Patient is having pain the left lower quadrant and a having some chest discomfort.   This pain has resolved there was some colon thickening that can likely explain this.  Family present at bedside all questions answered.          Medications:  Reviewed    Infusion Medications    sodium chloride       Scheduled Medications    [START ON 1/3/2024] aspirin  81 mg Oral Daily    clopidogrel  75 mg Oral Daily    atorvastatin  80 mg Oral Nightly    pantoprazole  40 mg IntraVENous Daily    ciprofloxacin  400 mg IntraVENous Q12H    sodium chloride flush  5-40 mL IntraVENous 2 times per day     PRN Meds: perflutren lipid microspheres, calcium carbonate, aluminum & magnesium hydroxide-simethicone, HYDROmorphone, sodium chloride flush, sodium chloride, potassium chloride **OR** potassium alternative oral  replacement **OR** potassium chloride, magnesium sulfate, ondansetron **OR** ondansetron, polyethylene glycol, acetaminophen **OR** acetaminophen      Intake/Output Summary (Last 24 hours) at 1/2/2024 1922  Last data filed at 1/2/2024 1820  Gross per 24 hour   Intake 499.88 ml   Output 700 ml   Net -200.12 ml         Physical Exam Performed:    /64   Pulse 76   Temp 97.8 °F (36.6 °C)   Resp 16   Ht 1.575 m (5' 2\")   Wt 57.6 kg (126 lb 15.8 oz)   SpO2 97%   BMI 23.23 kg/m²     General appearance: No apparent distress, appears stated age and cooperative.   HEENT: Pupils equal, round, and reactive to light. Conjunctivae/corneas clear.  Neck: Supple, with full range of motion. No jugular venous distention. Trachea midline.  Respiratory:  Normal respiratory effort. Decreased airmovment.  Cardiovascular: Regular rate and rhythm with normal S1/S2 without murmurs, rubs or gallops. No peripheral edema.  Abdomen: Soft, tender to palpation in the left lower quadrant. Still not tender in the right upper quadrant on my exam  : No CVA tenderness.  Musculoskeletal: No clubbing or cyanosis.  Full range of motion without deformity.  Skin: Skin color, texture, turgor normal.  No rashes or lesions.  Neurologic:  Neurovascularly intact without any focal sensory/motor deficits. Cranial nerves: II-XII intact, grossly non-focal.  Psychiatric: Alert and oriented, thought content appropriate, normal insight  Capillary Refill: Brisk, 3 seconds, normal   Peripheral Pulses: +2 palpable, equal bilaterally       Labs:   Recent Labs     12/31/23  0550 01/01/24  0625   WBC 9.7 10.7   HGB 14.5 14.7   HCT 42.0 42.7    175       Recent Labs     12/31/23  0550 01/01/24  0625 01/01/24  1824 01/02/24  0305    131*  --   --    K 3.5 2.9* 3.5 3.7   CL 99 94*  --   --    CO2 29 26  --   --    BUN 11 13  --   --    CREATININE <0.5* <0.5*  --   --    CALCIUM 9.0 9.3  --   --        Recent Labs     12/31/23  0550   AST 26   ALT 9*

## 2024-01-03 NOTE — DISCHARGE INSTR - COC
Mobility/ADLs:  Walking   {CHP DME ADLs:257967301}  Transfer  {CHP DME ADLs:089966756}  Bathing  {CHP DME ADLs:543710662}  Dressing  {CHP DME ADLs:691105119}  Toileting  {CHP DME ADLs:030476939}  Feeding  {CHP DME ADLs:796158378}  Med Admin  {CHP DME ADLs:614540558}  Med Delivery   { MARGARET MED Delivery:244480748}    Wound Care Documentation and Therapy:        Elimination:  Continence:   Bowel: {YES / NO:}  Bladder: {YES / NO:}  Urinary Catheter: {Urinary Catheter:024631524}   Colostomy/Ileostomy/Ileal Conduit: {YES / NO:}       Date of Last BM: ***    Intake/Output Summary (Last 24 hours) at 1/3/2024 1348  Last data filed at 1/3/2024 0600  Gross per 24 hour   Intake 446.56 ml   Output 500 ml   Net -53.44 ml     I/O last 3 completed shifts:  In: 946.4 [P.O.:240; I.V.:124.6; IV Piggyback:581.8]  Out: 800 [Urine:800]    Safety Concerns:     { MARGARET Safety Concerns:988657633}    Impairments/Disabilities:      { MARGARET Impairments/Disabilities:935908774}    Nutrition Therapy:  Current Nutrition Therapy:   { MARGARET Diet List:538013646}    Routes of Feeding: {CHP DME Other Feedings:887886842}  Liquids: {Slp liquid thickness:15772}  Daily Fluid Restriction: {CHP DME Yes amt example:470346625}  Last Modified Barium Swallow with Video (Video Swallowing Test): {Done Not Done Date:}    Treatments at the Time of Hospital Discharge:   Respiratory Treatments: ***  Oxygen Therapy:  {Therapy; copd oxygen:84658}  Ventilator:    { CC Vent List:786513708}    Rehab Therapies: {THERAPEUTIC INTERVENTION:7816716433}  Weight Bearing Status/Restrictions: {[facility] Weight Bearin}  Other Medical Equipment (for information only, NOT a DME order):  {EQUIPMENT:867274267}  Other Treatments: ***    Patient's personal belongings (please select all that are sent with patient):  {CAROL DME Belongings:125874520}    RN SIGNATURE:  {Esignature:560333476}    CASE MANAGEMENT/SOCIAL WORK SECTION    Inpatient Status Date:  ***    Readmission Risk Assessment Score:  Readmission Risk              Risk of Unplanned Readmission:  6           Discharging to Facility/ Agency   Name:   Address:  Phone:  Fax:    Dialysis Facility (if applicable)   Name:  Address:  Dialysis Schedule:  Phone:  Fax:    / signature: {Esignature:304300470}    PHYSICIAN SECTION    Prognosis: Fair    Condition at Discharge: Stable    Rehab Potential (if transferring to Rehab): Good    Recommended Labs or Other Treatments After Discharge:    Physician Certification: I certify the above information and transfer of Jacqui Mcfadden  is necessary for the continuing treatment of the diagnosis listed and that she requires Home Care for less 30 days.     Update Admission H&P: No change in H&P    PHYSICIAN SIGNATURE:  Electronically signed by NATHANAEL MCGUIRE MD on 1/3/24 at 1:48 PM EST

## 2024-01-03 NOTE — PROGRESS NOTES
[facility]  H+P  Consult  OP Visit  FU Visit   CC/INTERVAL HX   Admit 12/29/2023   CC Elevated troponin   Subjective Doing well this am.  No cp, sob.   Tele Reviewed available   EXAM   VS /65   Pulse 81   Temp 97.6 °F (36.4 °C) (Temporal)   Resp 14   Ht 1.575 m (5' 2\")   Wt 55.2 kg (121 lb 12.8 oz)   SpO2 96%   BMI 22.28 kg/m²    Gen Alert, coop, no distress Heart  RRR, no MRG   Head NC, AT, no abnorm Abd  Soft, NT, +BS, no mass, no OM   Eyes PER, conj/corn clear Ext  Ext nl, AT, no C/C/E   Nose Nares nl, no drain, NT Pulse 2+ and symmetric   Throat Lips, mucosa, tongue nl Skin Col/text/turg nl, no vis rash/les   Neck S/S, TM, NT, no bruit/JVD Psych Nl mood and affect   Lung CTA-B, unlabored, no DTP Lymph   No cervical or axillary LA   Ch wall NT, no deform Neuro  Nl gross M/S exam      MEDICATIONS    aspirin  81 mg Oral Daily    clopidogrel  75 mg Oral Daily    atorvastatin  80 mg Oral Nightly    pantoprazole  40 mg IntraVENous Daily    ciprofloxacin  400 mg IntraVENous Q12H    sodium chloride flush  5-40 mL IntraVENous 2 times per day      sodium chloride        LABS   Hgb Lab Results   Component Value Date    HGB 13.3 01/03/2024      Cr Lab Results   Component Value Date    CREATININE <0.5 (L) 01/03/2024      Trop Lab Results   Component Value Date    TROPHS 111 (H) 12/31/2023    TROPHS 193 (H) 12/30/2023    TROPHS 283 (H) 12/30/2023       ASSESSMENT TIME   More than 35 minutes spent reviewing patient chart (including but not limited to notes, labs, imaging and other testing), interviewing patient and/or family members, performing a physical exam, documentation of my findings above and subsequent follow-up of ordered testing.  More than 50% of that time spent face to face with patient discussing clinical condition and counseling regarding treatment plan.     ASSESSMENT AND PLAN   *NSTEMI   Date EF Results   Sx   Stable, as detailed above   TTE 12/23 60%    Plan   S/p PCI of RCA and

## 2024-01-10 ENCOUNTER — OFFICE VISIT (OUTPATIENT)
Dept: SURGERY | Age: 79
End: 2024-01-10
Payer: COMMERCIAL

## 2024-01-10 VITALS — BODY MASS INDEX: 23.12 KG/M2 | SYSTOLIC BLOOD PRESSURE: 110 MMHG | DIASTOLIC BLOOD PRESSURE: 70 MMHG | WEIGHT: 126.4 LBS

## 2024-01-10 DIAGNOSIS — K81.0 ACUTE CHOLECYSTITIS: Primary | ICD-10-CM

## 2024-01-10 PROCEDURE — 1123F ACP DISCUSS/DSCN MKR DOCD: CPT | Performed by: SURGERY

## 2024-01-10 PROCEDURE — 99213 OFFICE O/P EST LOW 20 MIN: CPT | Performed by: SURGERY

## 2024-01-10 NOTE — PROGRESS NOTES
Subjective:      Chief complaint-abdominal pain    Patient ID: HPICyn Mcfadden is a 78 y.o. female seen in follow-up for right upper quadrant abdominal pain    HPI    Review of Systems    Objective:   Physical Exam  Constitutional:       Appearance: She is well-developed.   HENT:      Head: Normocephalic and atraumatic.      Right Ear: External ear normal.      Left Ear: External ear normal.   Eyes:      Conjunctiva/sclera: Conjunctivae normal.   Cardiovascular:      Rate and Rhythm: Normal rate and regular rhythm.   Pulmonary:      Effort: Pulmonary effort is normal.      Breath sounds: Normal breath sounds.   Abdominal:      General: There is no distension.      Palpations: Abdomen is soft.      Tenderness: There is no abdominal tenderness.   Musculoskeletal:         General: Normal range of motion.      Cervical back: Normal range of motion and neck supple.   Skin:     General: Skin is warm and dry.   Neurological:      Mental Status: She is alert and oriented to person, place, and time.   Psychiatric:         Behavior: Behavior normal.         Assessment:      78-year-old female recently admitted to the hospital with upper abdominal pain.  Her troponin levels were elevated upon admission.  She was diagnosed with acute calculus cholecystitis but underwent cardiac catheterization with stent placement.  She was not a candidate for surgery because of the need for antiplatelet therapy.  The acute calculus cholecystitis was successfully treated with antibiotics.  Her only complaint at this time is of constipation.  There is no right upper quadrant abdominal tenderness on physical examination.      Plan:      Recommended magnesium citrate for constipation  Will plan for laparoscopic cholecystectomy when the patient is able to be off Plavix  In the meantime, she will contact the office for any recurrent symptoms of upper abdominal pain        Robert Cruz MD

## 2024-01-11 ENCOUNTER — OFFICE VISIT (OUTPATIENT)
Dept: CARDIOLOGY CLINIC | Age: 79
End: 2024-01-11

## 2024-01-11 VITALS
OXYGEN SATURATION: 98 % | BODY MASS INDEX: 22.63 KG/M2 | SYSTOLIC BLOOD PRESSURE: 90 MMHG | HEART RATE: 66 BPM | DIASTOLIC BLOOD PRESSURE: 60 MMHG | HEIGHT: 62 IN | WEIGHT: 123 LBS

## 2024-01-11 DIAGNOSIS — I25.10 CORONARY ARTERY DISEASE INVOLVING NATIVE HEART WITHOUT ANGINA PECTORIS, UNSPECIFIED VESSEL OR LESION TYPE: ICD-10-CM

## 2024-01-11 DIAGNOSIS — Z09 HOSPITAL DISCHARGE FOLLOW-UP: ICD-10-CM

## 2024-01-11 DIAGNOSIS — E78.5 HYPERLIPIDEMIA, UNSPECIFIED HYPERLIPIDEMIA TYPE: Primary | ICD-10-CM

## 2024-01-11 DIAGNOSIS — I95.9 HYPOTENSION, UNSPECIFIED HYPOTENSION TYPE: ICD-10-CM

## 2024-01-11 RX ORDER — CLOPIDOGREL BISULFATE 75 MG/1
75 TABLET ORAL DAILY
Qty: 90 TABLET | Refills: 3 | Status: SHIPPED | OUTPATIENT
Start: 2024-01-11

## 2024-01-11 RX ORDER — RANOLAZINE 500 MG/1
500 TABLET, EXTENDED RELEASE ORAL 2 TIMES DAILY
Qty: 60 TABLET | Refills: 3 | Status: SHIPPED | OUTPATIENT
Start: 2024-01-11

## 2024-01-11 NOTE — PROGRESS NOTES
wheezing.  CARDIOVASCULAR:  Regular rate and rhythm without murmur. Normal S1 and S2. No gallops or rubs. No elevation of JVP.    GI:  Normal bowel sounds. Non-distended. Non-tender to palpation  EXT: No edema. No calf tenderness. Pulses are present bilaterally.    DATA:    Lab Results   Component Value Date    ALT 9 (L) 12/31/2023    AST 26 12/31/2023    ALKPHOS 93 12/31/2023    BILITOT 0.7 12/31/2023     Lab Results   Component Value Date    CREATININE <0.5 (L) 01/03/2024    BUN 11 01/03/2024     (L) 01/03/2024    K 4.0 01/03/2024    CL 97 (L) 01/03/2024    CO2 24 01/03/2024     No results found for: \"TSH\", \"X9NPAEN\", \"F2ISPGO\", \"THYROIDAB\"  Lab Results   Component Value Date    WBC 7.7 01/03/2024    HGB 13.3 01/03/2024    HCT 38.7 01/03/2024    MCV 85.9 01/03/2024     01/03/2024     No components found for: \"CHLPL\"  Lab Results   Component Value Date    TRIG 85 01/03/2024     Lab Results   Component Value Date    HDL 60 01/03/2024     Lab Results   Component Value Date    LDLCALC 86 01/03/2024     Lab Results   Component Value Date    LABVLDL 17 01/03/2024     Lab Results   Component Value Date/Time    PROBNP 12,259 12/31/2023 05:50 AM     Radiology Review:  Pertinent images / reports were reviewed as a part of this visit and reveals the following:    Last Echo: 12/31/23  Summary   Normal left ventricle size, wall thickness, and systolic function with an   estimated ejection fraction of 55-60%.   No regional wall motion abnormalities are seen.   Mild mitral regurgitation.    Last Angiogram: 1/2/24  FINDINGS   Artery Findings   LM Normal   LAD 90% prox, 80% mid, 95% mid-distal   Cx 50% mid   RI 40% ostial to proximal   RCA 90% prox, 80% mid   LVEDP 1mmHg Normal 3-12mmHg   LVG N/A  Normal >/= 55%   AVG Normal      INTERVENTION(S)      Artery Location Intervention Result   LAD Mid Xience 2.5X38 opw Xience 3.0X38 opw 3.25X18  Distal PD 2.75  Mid to proximal PD 3.25 No Residual   RCA Mid Xience 3.5X38 opw

## 2024-01-11 NOTE — PATIENT INSTRUCTIONS
Start ranexa 500mg twice daily     Make sure to eat and drink enough     Complete fasting blood work end of march with new statin. Fast for 12 hours prior, may have black coffee or water.     [facility] [place]  [address] [place]  [address] [phone]    Follow up in 1 month

## 2024-01-29 PROBLEM — R79.89 ELEVATED TROPONIN: Status: RESOLVED | Noted: 2023-12-30 | Resolved: 2024-01-29

## 2024-01-30 NOTE — PROGRESS NOTES
[facility]  H+P  Consult  OP Visit  FU Visit   CC/HPI   CC Followup visit for cardiac conditions detailed in assessment and plan below.   Intervention PCI   General Doing well.  No new concerns.     Cardiac Sx -CP, -SOB, -dizziness, -syncope, -edema, -orthopnea, -pnd, -fatigue   HISTORY/ALLERGY/ROS   M/S/S/F Hx Reviewed in Epic and updated as appropriate   ALLERG Demerol, Morphine, Percocet [oxycodone-acetaminophen], and Vicodin [hydrocodone-acetaminophen]   ROS Full ROS obtained and negative except as mentioned in HPI   MEDICATIONS   Cardiac medications reviewed in [facility] during visit.   PHYSICAL EXAM   Vitals BP 98/60 (Site: Right Upper Arm, Position: Sitting, Cuff Size: Medium Adult)   Pulse 88   Ht 1.575 m (5' 2.01\")   Wt 56.2 kg (124 lb)   SpO2 93%   BMI 22.67 kg/m²    Gen Alert, coop, no distress Heart  RRR, no MRG   Lung CTA-B, unlabored, no DTP Extrem Edema -Grade 0 (0mm)      COMPLIANCE   Discussed and counseled on diet, exercise, weight loss, smoking, alcohol, drugs.  All questions answered.   CODING   SCI (85663) - 30-39 mins spent reviewing hx/tests/consults, performing exam, counseling/educating, ordering meds/tests/procedures, referring/communicating w/PCP/consultants, documenting in EMR, interpreting results, communicating medical information and plan with family.   SCRIBE ATTESTATION   Nurse NA   Doctor NA   ASSESSMENT AND PLAN   *CAD   Date EF Results   Sx   Stable, as detailed above   [facility] 1/24  PCI LAD, PCI RCA   MPI      TTE 12/23 60% Mild MR   Plan   Continue aggressive medical treatment at doses above  PFTs for SOB   *HTN  Status Advice Plan   Controlled Diet/salt/xcise/wt counseling Continue antihypertensives at current dosages   *CHOL  LDL Advice Plan   86, 1/24 Diet/salt/xcise/wt counseling Continue MT/HI statin   *FOLLOWUP  12 months

## 2024-02-01 ENCOUNTER — OFFICE VISIT (OUTPATIENT)
Dept: CARDIOLOGY CLINIC | Age: 79
End: 2024-02-01
Payer: COMMERCIAL

## 2024-02-01 VITALS
SYSTOLIC BLOOD PRESSURE: 98 MMHG | BODY MASS INDEX: 22.82 KG/M2 | DIASTOLIC BLOOD PRESSURE: 60 MMHG | HEART RATE: 88 BPM | OXYGEN SATURATION: 93 % | HEIGHT: 62 IN | WEIGHT: 124 LBS

## 2024-02-01 DIAGNOSIS — I10 ESSENTIAL HYPERTENSION: ICD-10-CM

## 2024-02-01 DIAGNOSIS — R06.02 SOB (SHORTNESS OF BREATH): ICD-10-CM

## 2024-02-01 DIAGNOSIS — E78.00 HYPERCHOLESTEROLEMIA: ICD-10-CM

## 2024-02-01 DIAGNOSIS — I25.83 CORONARY ARTERY DISEASE DUE TO LIPID RICH PLAQUE: Primary | ICD-10-CM

## 2024-02-01 DIAGNOSIS — I25.10 CORONARY ARTERY DISEASE DUE TO LIPID RICH PLAQUE: Primary | ICD-10-CM

## 2024-02-01 DIAGNOSIS — R06.2 WHEEZING: ICD-10-CM

## 2024-02-01 PROCEDURE — 1123F ACP DISCUSS/DSCN MKR DOCD: CPT | Performed by: INTERNAL MEDICINE

## 2024-02-01 PROCEDURE — 99214 OFFICE O/P EST MOD 30 MIN: CPT | Performed by: INTERNAL MEDICINE

## 2024-02-01 PROCEDURE — 3074F SYST BP LT 130 MM HG: CPT | Performed by: INTERNAL MEDICINE

## 2024-02-01 PROCEDURE — 3078F DIAST BP <80 MM HG: CPT | Performed by: INTERNAL MEDICINE

## 2024-02-01 NOTE — PATIENT INSTRUCTIONS
Follow up in 1 year with NP. Sooner with symptom changes    Take clopidogrel (plavix) daily for 6mo (through end of July)    If you have an other gallbladder attack. Dr Rodriguez would be okay with temporarily pausing your plavix AFTER 3 completed months for surgery if needed.    Take aspirin 81mg daily forever. We recommend the EC or coated version because it is easier on the stomach    Schedule Pulmonary function tests    Contact central scheduling at [address] [place] [address] or [phone]

## 2024-02-09 ENCOUNTER — HOSPITAL ENCOUNTER (OUTPATIENT)
Dept: PULMONOLOGY | Age: 79
Discharge: HOME | End: 2024-02-09
Attending: INTERNAL MEDICINE
Payer: COMMERCIAL

## 2024-02-09 VITALS — OXYGEN SATURATION: 98 % | RESPIRATION RATE: 16 BRPM | HEART RATE: 97 BPM

## 2024-02-09 DIAGNOSIS — I25.83 CORONARY ARTERY DISEASE DUE TO LIPID RICH PLAQUE: ICD-10-CM

## 2024-02-09 DIAGNOSIS — R06.02 SOB (SHORTNESS OF BREATH): ICD-10-CM

## 2024-02-09 DIAGNOSIS — I25.10 CORONARY ARTERY DISEASE DUE TO LIPID RICH PLAQUE: ICD-10-CM

## 2024-02-09 DIAGNOSIS — R06.2 WHEEZING: ICD-10-CM

## 2024-02-09 LAB
DLCO %PRED: 63 %
DLCO PRED: NORMAL
DLCO/VA %PRED: NORMAL
DLCO/VA PRED: NORMAL
DLCO/VA: NORMAL
DLCO: NORMAL
EXPIRATORY TIME-POST: NORMAL
EXPIRATORY TIME: NORMAL
FEF 25-75 %CHNG: NORMAL
FEF 25-75 POST %PRED: NORMAL
FEF 25-75% %PRED-PRE: NORMAL
FEF 25-75% PRED: NORMAL
FEF 25-75-POST: NORMAL
FEF 25-75-PRE: NORMAL
FEV1 %PRED-POST: NORMAL
FEV1 %PRED-PRE: 102 %
FEV1 PRED: NORMAL
FEV1-POST: NORMAL
FEV1-PRE: NORMAL
FEV1/FVC %PRED-POST: NORMAL
FEV1/FVC %PRED-PRE: NORMAL
FEV1/FVC PRED: NORMAL
FEV1/FVC-POST: NORMAL
FEV1/FVC-PRE: 65 %
FVC %PRED-POST: NORMAL
FVC %PRED-PRE: NORMAL
FVC PRED: NORMAL
FVC-POST: NORMAL
FVC-PRE: NORMAL
GAW %PRED: NORMAL
GAW PRED: NORMAL
GAW: NORMAL
IC PRE %PRED: NORMAL
IC PRED: NORMAL
IC: NORMAL
MEP: NORMAL
MIP: NORMAL
MVV %PRED-PRE: NORMAL
MVV PRED: NORMAL
MVV-PRE: NORMAL
PEF %PRED-POST: NORMAL
PEF %PRED-PRE: NORMAL
PEF PRED: NORMAL
PEF%CHNG: NORMAL
PEF-POST: NORMAL
PEF-PRE: NORMAL
RAW %PRED: NORMAL
RAW PRED: NORMAL
RAW: NORMAL
RV PRE %PRED: NORMAL
RV PRED: NORMAL
RV: NORMAL
SVC %PRED: NORMAL
SVC PRED: NORMAL
SVC: NORMAL
TLC PRE %PRED: 119 %
TLC PRED: NORMAL
TLC: NORMAL
VA %PRED: NORMAL
VA PRED: NORMAL
VA: NORMAL
VTG %PRED: NORMAL
VTG PRED: NORMAL
VTG: NORMAL

## 2024-02-09 PROCEDURE — 94729 DIFFUSING CAPACITY: CPT

## 2024-02-09 PROCEDURE — 94760 N-INVAS EAR/PLS OXIMETRY 1: CPT

## 2024-02-09 PROCEDURE — 94726 PLETHYSMOGRAPHY LUNG VOLUMES: CPT

## 2024-02-09 PROCEDURE — 94010 BREATHING CAPACITY TEST: CPT

## 2024-02-09 RX ORDER — ALBUTEROL SULFATE 90 UG/1
4 AEROSOL, METERED RESPIRATORY (INHALATION) ONCE
Status: CANCELLED | OUTPATIENT
Start: 2024-02-09

## 2024-02-09 ASSESSMENT — PULMONARY FUNCTION TESTS
FEV1_PERCENT_PREDICTED_PRE: 102
FEV1/FVC_PRE: 65

## 2024-02-13 ENCOUNTER — TELEPHONE (OUTPATIENT)
Dept: CARDIOLOGY CLINIC | Age: 79
End: 2024-02-13

## 2024-02-13 NOTE — TELEPHONE ENCOUNTER
I spoke with pt and relayed PFT results per DCE. Pt verbalized understanding.   While speaking with pt, she asked about her ranexa medication, I informed her that she should have refills at the pharmacy. Pt verbalized understanding.

## 2024-04-19 ENCOUNTER — TELEPHONE (OUTPATIENT)
Dept: CARDIOLOGY CLINIC | Age: 79
End: 2024-04-19

## 2024-04-19 DIAGNOSIS — I25.83 CORONARY ARTERY DISEASE DUE TO LIPID RICH PLAQUE: ICD-10-CM

## 2024-04-19 DIAGNOSIS — I25.10 CORONARY ARTERY DISEASE DUE TO LIPID RICH PLAQUE: ICD-10-CM

## 2024-04-19 DIAGNOSIS — K59.00 CONSTIPATION, UNSPECIFIED CONSTIPATION TYPE: Primary | ICD-10-CM

## 2024-04-19 DIAGNOSIS — H54.8 LEGALLY BLIND: ICD-10-CM

## 2024-04-19 NOTE — TELEPHONE ENCOUNTER
Called patient. Gave phone number for central scheduling, Dr Cruz office number, and close PCP office number. Kali martinez

## 2024-04-19 NOTE — TELEPHONE ENCOUNTER
Spoke with patient and for two weeks she has had SOB, exhaustion with going up and down stairs and heart burn.  Denies BP, Chest Pain, tightness or swelling.   She is unable to get a BP reading or a pulse.   Could some of these symptoms be related to her gallbladder issues?

## 2024-04-19 NOTE — TELEPHONE ENCOUNTER
Pt states she is experiencing extreme exhaustion, after only walking up a few steps she is sob.   She has a burning in her throat, and she is having a lot of cold sweats.    She is having bad constipation, and Miralax is not working.  She sounded sob while talking.  She eats a lot of grapefruit, and just noticed one of her new medications she cannot eat grapefruit.    She states she just needs to know if she is stressing herself out because of the heart attack and just needs to wait longer to heal from the stents, or if something is wrong.

## 2024-04-19 NOTE — TELEPHONE ENCOUNTER
Called patient and discussed symptoms. Her main complaint is constipation and severe belching. She is also complaining of occasional coldsweats. She is getting sob with exertion now which is new. No swelling. She is drinking more water. She has been compliant with medications including plavix and has not missed any doses. Patient has not seen PCP in 3 years. Discussed with CBM. Get echo and review with DCE next week. Go to ER if symptosm worsen. Reccoemend estabilshing with PCP and calling Dr Cruz for follow up for gallbladder

## 2024-04-24 ENCOUNTER — TELEPHONE (OUTPATIENT)
Dept: CARDIOLOGY CLINIC | Age: 79
End: 2024-04-24

## 2024-04-24 NOTE — TELEPHONE ENCOUNTER
CARDIAC CLEARANCE     What type of procedure are you having?  Oral sx needs extraction / unknown  Which physician is performing your procedure?  He is on [address], but she did not get the name.  When is your procedure scheduled for?  Tomorrow at 1:00, she just went to the dentist and her dentist considers it an emergency.  Dentist noted her tooth is cracked and laying in her mouth.  Where are you having this procedure?  At the oral sx office  Are you taking Blood Thinners? yes   If so what? plavix (Name/dose/frequesncy)   Does the surgeon want you to stop your blood thinner? This is an emergency he has prescribed antibiotics If so for how long?  Phone Number and Contact Name for Physicians office:  Fax number to send information:    She is wanting to know if DCE is okay with her having oral sx  Pt is very upset and in pain, but she said if she has to wait she will wait.

## 2024-04-24 NOTE — TELEPHONE ENCOUNTER
Discussed with DCE. Send pt to ER for evaluation of arm pain associated with emotional distress and GI symptoms. Called pt. vu

## 2024-04-25 ENCOUNTER — HOSPITAL ENCOUNTER (EMERGENCY)
Age: 79
Discharge: HOME | End: 2024-04-25
Attending: INTERNAL MEDICINE
Payer: MEDICARE

## 2024-04-25 ENCOUNTER — APPOINTMENT (OUTPATIENT)
Dept: GENERAL RADIOLOGY | Age: 79
End: 2024-04-25
Payer: MEDICARE

## 2024-04-25 VITALS
BODY MASS INDEX: 25.23 KG/M2 | OXYGEN SATURATION: 98 % | WEIGHT: 138 LBS | TEMPERATURE: 97.8 F | DIASTOLIC BLOOD PRESSURE: 66 MMHG | RESPIRATION RATE: 18 BRPM | SYSTOLIC BLOOD PRESSURE: 132 MMHG | HEART RATE: 83 BPM

## 2024-04-25 DIAGNOSIS — M79.602 LEFT ARM PAIN: Primary | ICD-10-CM

## 2024-04-25 LAB
ALBUMIN SERPL-MCNC: 4.2 G/DL (ref 3.4–5)
ALBUMIN/GLOB SERPL: 1.5 {RATIO} (ref 1.1–2.2)
ALP SERPL-CCNC: 105 U/L (ref 40–129)
ALT SERPL-CCNC: 15 U/L (ref 10–40)
ANION GAP SERPL CALCULATED.3IONS-SCNC: 13 MMOL/L (ref 3–16)
APTT BLD: 30.2 SEC (ref 22.1–36.4)
AST SERPL-CCNC: 18 U/L (ref 15–37)
BACTERIA URNS QL MICRO: ABNORMAL /HPF
BASOPHILS # BLD: 0.1 K/UL (ref 0–0.2)
BASOPHILS NFR BLD: 0.8 %
BILIRUB SERPL-MCNC: 0.4 MG/DL (ref 0–1)
BILIRUB UR QL STRIP.AUTO: NEGATIVE
BUN SERPL-MCNC: 14 MG/DL (ref 7–20)
CALCIUM SERPL-MCNC: 9.3 MG/DL (ref 8.3–10.6)
CHLORIDE SERPL-SCNC: 102 MMOL/L (ref 99–110)
CLARITY UR: CLEAR
CO2 SERPL-SCNC: 23 MMOL/L (ref 21–32)
COLOR UR: YELLOW
CREAT SERPL-MCNC: 0.6 MG/DL (ref 0.6–1.2)
DEPRECATED RDW RBC AUTO: 15.2 % (ref 12.4–15.4)
EKG ATRIAL RATE: 75 BPM
EKG DIAGNOSIS: NORMAL
EKG P AXIS: 12 DEGREES
EKG P-R INTERVAL: 160 MS
EKG Q-T INTERVAL: 388 MS
EKG QRS DURATION: 72 MS
EKG QTC CALCULATION (BAZETT): 433 MS
EKG R AXIS: 29 DEGREES
EKG T AXIS: 37 DEGREES
EKG VENTRICULAR RATE: 75 BPM
EOSINOPHIL # BLD: 0.2 K/UL (ref 0–0.6)
EOSINOPHIL NFR BLD: 3.2 %
EPI CELLS #/AREA URNS AUTO: 2 /HPF (ref 0–5)
GFR SERPLBLD CREATININE-BSD FMLA CKD-EPI: >90 ML/MIN/{1.73_M2}
GLUCOSE SERPL-MCNC: 96 MG/DL (ref 70–99)
GLUCOSE UR STRIP.AUTO-MCNC: NEGATIVE MG/DL
HCT VFR BLD AUTO: 41.4 % (ref 36–48)
HGB BLD-MCNC: 13.5 G/DL (ref 12–16)
HGB UR QL STRIP.AUTO: ABNORMAL
HYALINE CASTS #/AREA URNS AUTO: 1 /LPF (ref 0–8)
INR PPP: 1.02 (ref 0.85–1.15)
KETONES UR STRIP.AUTO-MCNC: NEGATIVE MG/DL
LEUKOCYTE ESTERASE UR QL STRIP.AUTO: ABNORMAL
LYMPHOCYTES # BLD: 1.6 K/UL (ref 1–5.1)
LYMPHOCYTES NFR BLD: 22.6 %
MCH RBC QN AUTO: 28 PG (ref 26–34)
MCHC RBC AUTO-ENTMCNC: 32.6 G/DL (ref 31–36)
MCV RBC AUTO: 86.1 FL (ref 80–100)
MONOCYTES # BLD: 0.7 K/UL (ref 0–1.3)
MONOCYTES NFR BLD: 10.4 %
NEUTROPHILS # BLD: 4.4 K/UL (ref 1.7–7.7)
NEUTROPHILS NFR BLD: 63 %
NITRITE UR QL STRIP.AUTO: NEGATIVE
NT-PROBNP SERPL-MCNC: 232 PG/ML (ref 0–449)
PH UR STRIP.AUTO: 5 [PH] (ref 5–8)
PLATELET # BLD AUTO: 195 K/UL (ref 135–450)
PMV BLD AUTO: 7.9 FL (ref 5–10.5)
POTASSIUM SERPL-SCNC: 3.9 MMOL/L (ref 3.5–5.1)
PROT SERPL-MCNC: 7 G/DL (ref 6.4–8.2)
PROT UR STRIP.AUTO-MCNC: ABNORMAL MG/DL
PROTHROMBIN TIME: 13.6 SEC (ref 11.9–14.9)
RBC # BLD AUTO: 4.8 M/UL (ref 4–5.2)
RBC CLUMPS #/AREA URNS AUTO: 12 /HPF (ref 0–4)
SODIUM SERPL-SCNC: 138 MMOL/L (ref 136–145)
SP GR UR STRIP.AUTO: 1.02 (ref 1–1.03)
TROPONIN, HIGH SENSITIVITY: 8 NG/L (ref 0–14)
UA COMPLETE W REFLEX CULTURE PNL UR: ABNORMAL
UA DIPSTICK W REFLEX MICRO PNL UR: YES
URN SPEC COLLECT METH UR: ABNORMAL
UROBILINOGEN UR STRIP-ACNC: 1 E.U./DL
WBC # BLD AUTO: 7.1 K/UL (ref 4–11)
WBC #/AREA URNS AUTO: 2 /HPF (ref 0–5)

## 2024-04-25 PROCEDURE — 84484 ASSAY OF TROPONIN QUANT: CPT

## 2024-04-25 PROCEDURE — 93010 ELECTROCARDIOGRAM REPORT: CPT | Performed by: INTERNAL MEDICINE

## 2024-04-25 PROCEDURE — 83880 ASSAY OF NATRIURETIC PEPTIDE: CPT

## 2024-04-25 PROCEDURE — 93005 ELECTROCARDIOGRAM TRACING: CPT | Performed by: PHYSICIAN ASSISTANT

## 2024-04-25 PROCEDURE — 81001 URINALYSIS AUTO W/SCOPE: CPT

## 2024-04-25 PROCEDURE — 85730 THROMBOPLASTIN TIME PARTIAL: CPT

## 2024-04-25 PROCEDURE — 99285 EMERGENCY DEPT VISIT HI MDM: CPT

## 2024-04-25 PROCEDURE — 85025 COMPLETE CBC W/AUTO DIFF WBC: CPT

## 2024-04-25 PROCEDURE — 85610 PROTHROMBIN TIME: CPT

## 2024-04-25 PROCEDURE — 71045 X-RAY EXAM CHEST 1 VIEW: CPT

## 2024-04-25 PROCEDURE — 80053 COMPREHEN METABOLIC PANEL: CPT

## 2024-04-25 ASSESSMENT — ENCOUNTER SYMPTOMS
VOMITING: 0
ABDOMINAL PAIN: 0
SHORTNESS OF BREATH: 0
DIARRHEA: 0
NAUSEA: 0
BACK PAIN: 0
BLOOD IN STOOL: 0
CONSTIPATION: 0

## 2024-04-25 ASSESSMENT — PAIN - FUNCTIONAL ASSESSMENT: PAIN_FUNCTIONAL_ASSESSMENT: 0-10

## 2024-04-25 ASSESSMENT — PAIN SCALES - GENERAL: PAINLEVEL_OUTOF10: 0

## 2024-04-25 NOTE — ED PROVIDER NOTES
In addition to the advanced practice provider, I personally saw Jacqui DAMARIS Mcfadden and performed a substantive portion of the visit including all aspects of the medical decision making.    Medical Decision Making  79-year-old female comes in today from Dr. Rodriguez's office for cardiac clearance to have a dental extraction.  Patient had an MI in December 2023.  Patient also states she has 5 stents.  Patient is on Plavix and aspirin.  Patient denies any chest pain, palpitations or dizziness to me.  EKG did not show any acute findings.  Patient will need an outpatient stress test.  I encouraged her to follow-up with Dr. Rodriguez for final clearance for the dental extraction.  Patient is hemodynamically stable.  Patient is stable for discharge.  I agree with the assessment plan.        EKG  EKG done at 9:16 AM shows normal sinus rhythm with 1 PVC at a rate of 75.  DC interval is 160.  QRSD interval is 72.  QTc interval is 433.  Good overall axis.  Poor R wave transition.  No ST elevation.  No ST depression.  When compared with an old EKG done on January 1, 2024 the overall axis is the same.  The R wave progression is similar.  Both EKGs are similar.    SEP-1  Is this patient to be included in the SEP-1 Core Measure due to severe sepsis or septic shock?   No    Screenings                   Patient Referrals:  Tammy Anderson MD  [address] [place] [address] [place] [address] [phone]          Mustapha Rodriguez MD  [address] [place] [address]. [address]  [place] [address]  [phone]      follow up in 1-3 days      Discharge Medications:  Discharge Medication List as of 4/25/2024 12:04 PM          FINAL IMPRESSION  1. Left arm pain        Blood pressure 132/66, pulse 83, temperature 97.8 °F (36.6 °C), resp. rate 18, weight 62.6 kg (138 lb), SpO2 98 %.     For further details of Jacqui OCAMPO Maribethgibran's emergency department encounter, please see documentation by advanced practice provider, Keyon Verdugo.        Atilio Lockhart,

## 2024-04-25 NOTE — ED PROVIDER NOTES
[facility] EMERGENCY DEPARTMENT  EMERGENCY DEPARTMENT ENCOUNTER        Pt Name: Jacqui Mcfadden  MRN: [id number]  Birthdate 1945  Date of evaluation: 4/25/2024  Provider: Keyon Verdugo PA-C  PCP: Tammy Anderson MD  Note Started: 9:24 AM EDT 4/25/24       I have seen and evaluated this patient with my supervising physician Atilio Lockhart DO.      CHIEF COMPLAINT       Chief Complaint   Patient presents with    Surgical Clearance     Sent by dentist/Michael's office rt need for tooth extraction & blood thinner use following heart attack in January.          HISTORY OF PRESENT ILLNESS: 1 or more Elements     History from : Patient    Limitations to history : None    Jacqui Mcfadden is a 79 y.o. female who presents to the emergency department at the request of Dr. Rodriguez, cardiologist, office for cardiac clearance.  Patient was scheduled today for a dental extraction but because she had a recent MI in December 2023 with 5 stents placed and is now anticoagulated on Plavix, she was advised that she needed cardiac clearance before she can have the dental extraction.  Last week, she battled with nausea, vomiting diarrhea but it is resolved.  She has also had intermittent left arm pain for months.  However, denies nausea, vomiting, diarrhea or arm pain at this time.  She is not reporting any shortness of breath.  She does have high anxiety and some depression after her  recently passed.  She lives alone with her dog.  Her sister-in-law is at bedside.  Her sister-in-law lives down the street and they are very close.    Nursing Notes were all reviewed and agreed with or any disagreements were addressed in the HPI.    REVIEW OF SYSTEMS :      Review of Systems   Constitutional:  Negative for chills and fever.   HENT: Negative.     Eyes:  Negative for visual disturbance.   Respiratory:  Negative for shortness of breath.    Cardiovascular:  Negative for chest pain.   Gastrointestinal:

## 2024-04-26 ENCOUNTER — TELEPHONE (OUTPATIENT)
Dept: CARDIOLOGY CLINIC | Age: 79
End: 2024-04-26

## 2024-05-07 ENCOUNTER — HOSPITAL ENCOUNTER (OUTPATIENT)
Age: 79
Discharge: HOME | End: 2024-05-09
Payer: COMMERCIAL

## 2024-05-07 VITALS — WEIGHT: 138 LBS | BODY MASS INDEX: 25.4 KG/M2 | HEIGHT: 62 IN

## 2024-05-07 DIAGNOSIS — M79.602 LEFT ARM PAIN: ICD-10-CM

## 2024-05-07 LAB
ECHO BSA: 1.65 M2
NUC REST DIASTOLIC VOLUME INDEX: 39 ML/M2
NUC REST EJECTION FRACTION: 63 %
NUC REST SYSTOLIC VOLUME INDEX: 14 ML/M2
NUC STRESS EJECTION FRACTION: 63 %
NUC STRESS LV EDV: 62 ML (ref 56–104)
NUC STRESS LV ESV: 23 ML (ref 19–49)
NUC STRESS LV MASS: 122 G
STRESS BASELINE DIAS BP: 74 MMHG
STRESS BASELINE HR: 79 BPM
STRESS BASELINE SYS BP: 121 MMHG
STRESS ESTIMATED WORKLOAD: 1 METS
STRESS EXERCISE DUR MIN: 4 MIN
STRESS O2 SAT PEAK: 98 %
STRESS O2 SAT REST: 96 %
STRESS PEAK DIAS BP: 73 MMHG
STRESS PEAK SYS BP: 134 MMHG
STRESS PERCENT HR ACHIEVED: 85 %
STRESS POST PEAK HR: 120 BPM
STRESS RATE PRESSURE PRODUCT: NORMAL BPM*MMHG
STRESS TARGET HR: 141 BPM
TID: 1.3

## 2024-05-07 PROCEDURE — 93018 CV STRESS TEST I&R ONLY: CPT | Performed by: INTERNAL MEDICINE

## 2024-05-07 PROCEDURE — 93017 CV STRESS TEST TRACING ONLY: CPT

## 2024-05-07 PROCEDURE — 6360000002 HC RX W HCPCS: Performed by: INTERNAL MEDICINE

## 2024-05-07 PROCEDURE — 78452 HT MUSCLE IMAGE SPECT MULT: CPT

## 2024-05-07 PROCEDURE — 6360000002 HC RX W HCPCS: Performed by: PHYSICIAN ASSISTANT

## 2024-05-07 PROCEDURE — 78452 HT MUSCLE IMAGE SPECT MULT: CPT | Performed by: INTERNAL MEDICINE

## 2024-05-07 PROCEDURE — 93016 CV STRESS TEST SUPVJ ONLY: CPT | Performed by: INTERNAL MEDICINE

## 2024-05-07 RX ORDER — AMINOPHYLLINE 25 MG/ML
100 INJECTION, SOLUTION INTRAVENOUS ONCE
Status: COMPLETED | OUTPATIENT
Start: 2024-05-07 | End: 2024-05-07

## 2024-05-07 RX ORDER — REGADENOSON 0.08 MG/ML
0.4 INJECTION, SOLUTION INTRAVENOUS
Status: COMPLETED | OUTPATIENT
Start: 2024-05-07 | End: 2024-05-07

## 2024-05-07 RX ORDER — REGADENOSON 0.08 MG/ML
0.4 INJECTION, SOLUTION INTRAVENOUS
Status: DISCONTINUED | OUTPATIENT
Start: 2024-05-07 | End: 2024-05-07

## 2024-05-07 RX ADMIN — REGADENOSON 0.4 MG: 0.08 INJECTION, SOLUTION INTRAVENOUS at 09:52

## 2024-05-07 RX ADMIN — AMINOPHYLLINE 100 MG: 25 INJECTION, SOLUTION INTRAVENOUS at 10:07

## 2024-05-20 ENCOUNTER — TELEPHONE (OUTPATIENT)
Dept: CARDIOLOGY CLINIC | Age: 79
End: 2024-05-20

## 2024-05-20 NOTE — TELEPHONE ENCOUNTER
PT called with concerns for her health after being informed that she is no longer established with  PCP.     I provided pt with the number and address to [facility] urgent care in order for her to be checked out promptly and get aid with establishing with new primary care physician in person.    Pt has to set up transportation for she is legally blind.

## 2024-05-24 ENCOUNTER — OFFICE VISIT (OUTPATIENT)
Dept: FAMILY MEDICINE CLINIC | Age: 79
End: 2024-05-24
Payer: COMMERCIAL

## 2024-05-24 VITALS
BODY MASS INDEX: 25.14 KG/M2 | HEART RATE: 92 BPM | HEIGHT: 62 IN | SYSTOLIC BLOOD PRESSURE: 100 MMHG | OXYGEN SATURATION: 96 % | DIASTOLIC BLOOD PRESSURE: 66 MMHG | WEIGHT: 136.6 LBS

## 2024-05-24 DIAGNOSIS — H91.13 PRESBYCUSIS OF BOTH EARS: ICD-10-CM

## 2024-05-24 DIAGNOSIS — I25.2 HISTORY OF MI (MYOCARDIAL INFARCTION): ICD-10-CM

## 2024-05-24 DIAGNOSIS — Z00.00 ANNUAL PHYSICAL EXAM: Primary | ICD-10-CM

## 2024-05-24 DIAGNOSIS — Z76.89 ENCOUNTER TO ESTABLISH CARE: ICD-10-CM

## 2024-05-24 DIAGNOSIS — H61.21 EXCESSIVE CERUMEN IN RIGHT EAR CANAL: ICD-10-CM

## 2024-05-24 PROCEDURE — 99387 INIT PM E/M NEW PAT 65+ YRS: CPT | Performed by: STUDENT IN AN ORGANIZED HEALTH CARE EDUCATION/TRAINING PROGRAM

## 2024-05-24 RX ORDER — OMEPRAZOLE 20 MG/1
20 CAPSULE, DELAYED RELEASE ORAL
Qty: 60 CAPSULE | Refills: 0 | Status: SHIPPED | OUTPATIENT
Start: 2024-05-24

## 2024-05-24 RX ORDER — DOCUSATE SODIUM 100 MG/1
100 CAPSULE, LIQUID FILLED ORAL 2 TIMES DAILY
Qty: 60 CAPSULE | Refills: 0 | Status: SHIPPED | OUTPATIENT
Start: 2024-05-24 | End: 2024-06-23

## 2024-05-24 SDOH — ECONOMIC STABILITY: FOOD INSECURITY: WITHIN THE PAST 12 MONTHS, YOU WORRIED THAT YOUR FOOD WOULD RUN OUT BEFORE YOU GOT MONEY TO BUY MORE.: NEVER TRUE

## 2024-05-24 SDOH — ECONOMIC STABILITY: INCOME INSECURITY: HOW HARD IS IT FOR YOU TO PAY FOR THE VERY BASICS LIKE FOOD, HOUSING, MEDICAL CARE, AND HEATING?: NOT HARD AT ALL

## 2024-05-24 SDOH — ECONOMIC STABILITY: FOOD INSECURITY: WITHIN THE PAST 12 MONTHS, THE FOOD YOU BOUGHT JUST DIDN'T LAST AND YOU DIDN'T HAVE MONEY TO GET MORE.: NEVER TRUE

## 2024-05-24 SDOH — ECONOMIC STABILITY: HOUSING INSECURITY
IN THE LAST 12 MONTHS, WAS THERE A TIME WHEN YOU DID NOT HAVE A STEADY PLACE TO SLEEP OR SLEPT IN A SHELTER (INCLUDING NOW)?: NO

## 2024-05-24 ASSESSMENT — PATIENT HEALTH QUESTIONNAIRE - PHQ9
9. THOUGHTS THAT YOU WOULD BE BETTER OFF DEAD, OR OF HURTING YOURSELF: NOT AT ALL
7. TROUBLE CONCENTRATING ON THINGS, SUCH AS READING THE NEWSPAPER OR WATCHING TELEVISION: NOT AT ALL
SUM OF ALL RESPONSES TO PHQ QUESTIONS 1-9: 15
10. IF YOU CHECKED OFF ANY PROBLEMS, HOW DIFFICULT HAVE THESE PROBLEMS MADE IT FOR YOU TO DO YOUR WORK, TAKE CARE OF THINGS AT HOME, OR GET ALONG WITH OTHER PEOPLE: NOT DIFFICULT AT ALL
3. TROUBLE FALLING OR STAYING ASLEEP: NEARLY EVERY DAY
5. POOR APPETITE OR OVEREATING: NEARLY EVERY DAY
1. LITTLE INTEREST OR PLEASURE IN DOING THINGS: NEARLY EVERY DAY
6. FEELING BAD ABOUT YOURSELF - OR THAT YOU ARE A FAILURE OR HAVE LET YOURSELF OR YOUR FAMILY DOWN: NOT AT ALL
SUM OF ALL RESPONSES TO PHQ QUESTIONS 1-9: 15
SUM OF ALL RESPONSES TO PHQ QUESTIONS 1-9: 15
2. FEELING DOWN, DEPRESSED OR HOPELESS: NEARLY EVERY DAY
4. FEELING TIRED OR HAVING LITTLE ENERGY: NEARLY EVERY DAY
SUM OF ALL RESPONSES TO PHQ9 QUESTIONS 1 & 2: 6
8. MOVING OR SPEAKING SO SLOWLY THAT OTHER PEOPLE COULD HAVE NOTICED. OR THE OPPOSITE, BEING SO FIGETY OR RESTLESS THAT YOU HAVE BEEN MOVING AROUND A LOT MORE THAN USUAL: NOT AT ALL
SUM OF ALL RESPONSES TO PHQ QUESTIONS 1-9: 15

## 2024-05-24 NOTE — PATIENT INSTRUCTIONS
Schedule AWV    Starting Omeprazole at 20mg daily. If it is not strong enough, you can take 2 daily but let us know that you've increased the dose    Depression/Anxiety - Mary Perez (Psychologist)

## 2024-05-28 ASSESSMENT — ENCOUNTER SYMPTOMS
SORE THROAT: 0
SHORTNESS OF BREATH: 0
WHEEZING: 0
DIARRHEA: 0
COUGH: 0
CONSTIPATION: 1

## 2024-05-28 NOTE — PROGRESS NOTES
Jacqui Mcfadden is a 79 y.o. year old female here for:    Chief Complaint:    Chief Complaint   Patient presents with    New Patient    Hearing Problem    Chest Congestion     X 6 days     Constipation     Currently taking miralax but denies improvement     Depression    Anxiety    Other     Cold sweats        Subjective:         HPI:     Patient presenting today with several complaints including hearing difficulty, upper respiratory symptoms, constipation, depression/anxiety.  She states that for many years, she does not see any physicians but was recently in the hospital following an MI.  She has since established with cardiology and has been evaluated and managed by specialists without the presence of a PCP.  States that she is very frustrated with the medical process and feels that often, she is not listened to.  Other than the above concerns she has no acute issues.    Past Medical History:   Diagnosis Date    CAD (coronary artery disease)     Histoplasmosis     bilateral eyes    Hyperlipidemia     Legally blind     MI (myocardial infarction) (HCC)      Social History     Tobacco Use    Smoking status: Former     Current packs/day: 1.00     Average packs/day: 1 pack/day for 0.4 years (0.4 ttl pk-yrs)     Types: Cigarettes     Start date: 12/29/2023     Quit date: 1/1/1975    Smokeless tobacco: Never   Substance Use Topics    Alcohol use: No     History reviewed. No pertinent family history.  Past Surgical History:   Procedure Laterality Date    APPENDECTOMY      CORONARY ANGIOPLASTY      DIAGNOSTIC CARDIAC CATH LAB PROCEDURE      NECK SURGERY      2007         Current Outpatient Medications:     docusate sodium (COLACE) 100 MG capsule, Take 1 capsule by mouth 2 times daily, Disp: 60 capsule, Rfl: 0    omeprazole (PRILOSEC) 20 MG delayed release capsule, Take 1 capsule by mouth every morning (before breakfast), Disp: 60 capsule, Rfl: 0    ranolazine (RANEXA) 500 MG extended release tablet, Take 1 tablet by

## 2024-06-03 ENCOUNTER — HOSPITAL ENCOUNTER (OUTPATIENT)
Dept: CARDIOLOGY | Age: 79
Discharge: HOME | End: 2024-06-05
Payer: COMMERCIAL

## 2024-06-03 VITALS
DIASTOLIC BLOOD PRESSURE: 66 MMHG | SYSTOLIC BLOOD PRESSURE: 100 MMHG | WEIGHT: 136 LBS | BODY MASS INDEX: 25.03 KG/M2 | HEIGHT: 62 IN

## 2024-06-03 DIAGNOSIS — I25.10 CORONARY ARTERY DISEASE DUE TO LIPID RICH PLAQUE: ICD-10-CM

## 2024-06-03 DIAGNOSIS — I25.83 CORONARY ARTERY DISEASE DUE TO LIPID RICH PLAQUE: ICD-10-CM

## 2024-06-03 LAB
ECHO AO ROOT DIAM: 3.3 CM
ECHO AO ROOT INDEX: 2.04 CM/M2
ECHO AV AREA PEAK VELOCITY: 2.3 CM2
ECHO AV AREA VTI: 2.5 CM2
ECHO AV AREA/BSA PEAK VELOCITY: 1.4 CM2/M2
ECHO AV AREA/BSA VTI: 1.5 CM2/M2
ECHO AV MEAN GRADIENT: 3 MMHG
ECHO AV MEAN VELOCITY: 0.8 M/S
ECHO AV PEAK GRADIENT: 5 MMHG
ECHO AV PEAK VELOCITY: 1.1 M/S
ECHO AV VELOCITY RATIO: 0.82
ECHO AV VTI: 22.9 CM
ECHO BSA: 1.64 M2
ECHO EST RA PRESSURE: 3 MMHG
ECHO LA AREA 4C: 9.7 CM2
ECHO LA DIAMETER INDEX: 1.85 CM/M2
ECHO LA DIAMETER: 3 CM
ECHO LA MAJOR AXIS: 4.3 CM
ECHO LA TO AORTIC ROOT RATIO: 0.91
ECHO LA VOL MOD A4C: 18 ML (ref 22–52)
ECHO LA VOLUME INDEX MOD A4C: 11 ML/M2 (ref 16–34)
ECHO LV E' LATERAL VELOCITY: 10 CM/S
ECHO LV E' SEPTAL VELOCITY: 7 CM/S
ECHO LV EDV A2C: 64 ML
ECHO LV EDV A4C: 60 ML
ECHO LV EDV INDEX A4C: 37 ML/M2
ECHO LV EDV NDEX A2C: 40 ML/M2
ECHO LV EJECTION FRACTION A2C: 58 %
ECHO LV EJECTION FRACTION A4C: 55 %
ECHO LV EJECTION FRACTION BIPLANE: 59 % (ref 55–100)
ECHO LV ESV A2C: 27 ML
ECHO LV ESV A4C: 27 ML
ECHO LV ESV INDEX A2C: 17 ML/M2
ECHO LV ESV INDEX A4C: 17 ML/M2
ECHO LV FRACTIONAL SHORTENING: 27 % (ref 28–44)
ECHO LV INTERNAL DIMENSION DIASTOLE INDEX: 2.72 CM/M2
ECHO LV INTERNAL DIMENSION DIASTOLIC: 4.4 CM (ref 3.9–5.3)
ECHO LV INTERNAL DIMENSION SYSTOLIC INDEX: 1.98 CM/M2
ECHO LV INTERNAL DIMENSION SYSTOLIC: 3.2 CM
ECHO LV IVSD: 1 CM (ref 0.6–0.9)
ECHO LV MASS 2D: 118.6 G (ref 67–162)
ECHO LV MASS INDEX 2D: 73.2 G/M2 (ref 43–95)
ECHO LV POSTERIOR WALL DIASTOLIC: 0.7 CM (ref 0.6–0.9)
ECHO LV RELATIVE WALL THICKNESS RATIO: 0.32
ECHO LVOT AREA: 2.8 CM2
ECHO LVOT AV VTI INDEX: 0.89
ECHO LVOT DIAM: 1.9 CM
ECHO LVOT MEAN GRADIENT: 1 MMHG
ECHO LVOT PEAK GRADIENT: 3 MMHG
ECHO LVOT PEAK VELOCITY: 0.9 M/S
ECHO LVOT STROKE VOLUME INDEX: 35.5 ML/M2
ECHO LVOT SV: 57.5 ML
ECHO LVOT VTI: 20.3 CM
ECHO MV A VELOCITY: 0.9 M/S
ECHO MV E VELOCITY: 0.56 M/S
ECHO MV E/A RATIO: 0.62
ECHO MV E/E' LATERAL: 5.6
ECHO MV E/E' RATIO (AVERAGED): 6.8
ECHO MV E/E' SEPTAL: 8
ECHO RA AREA 4C: 9.7 CM2
ECHO RA END SYSTOLIC VOLUME APICAL 4 CHAMBER INDEX BSA: 9 ML/M2
ECHO RA VOLUME: 14 ML
ECHO RV BASAL DIMENSION: 2.9 CM
ECHO RV FREE WALL PEAK S': 13 CM/S
ECHO RV LONGITUDINAL DIMENSION: 6.5 CM
ECHO RV MID DIMENSION: 2.5 CM
ECHO RV TAPSE: 1.9 CM (ref 1.7–?)

## 2024-06-03 PROCEDURE — 93306 TTE W/DOPPLER COMPLETE: CPT

## 2024-06-05 RX ORDER — RANOLAZINE 500 MG/1
500 TABLET, EXTENDED RELEASE ORAL 2 TIMES DAILY
Qty: 60 TABLET | Refills: 3 | Status: SHIPPED | OUTPATIENT
Start: 2024-06-05

## 2024-06-05 RX ORDER — ATORVASTATIN CALCIUM 80 MG/1
80 TABLET, FILM COATED ORAL NIGHTLY
Qty: 30 TABLET | Refills: 3 | Status: SHIPPED | OUTPATIENT
Start: 2024-06-05

## 2024-06-05 NOTE — TELEPHONE ENCOUNTER
Medication Refill    Medication needing refilled:  atorvastatin   ranolazine     Dosage of the medication:  80mg  500mg    How are you taking this medication (QD, BID, TID, QID, PRN):  Take 1 tablet by mouth nightly   Take 1 tablet by mouth 2 times daily     30 or 90 day supply called in:    60  120    When will you run out of your medication:    Which Pharmacy are we sending the medication to?:    [id number]  [address] [place] [id number]  Phone: [phone]  Fax: [phone]       NOTE:  Pt called to ask if someone from the office would call her to discuss what her meds are for

## 2024-06-14 ENCOUNTER — PROCEDURE VISIT (OUTPATIENT)
Dept: AUDIOLOGY | Age: 79
End: 2024-06-14

## 2024-06-14 DIAGNOSIS — H61.21 IMPACTED CERUMEN OF RIGHT EAR: Primary | ICD-10-CM

## 2024-06-14 DIAGNOSIS — H91.90 HEARING LOSS, UNSPECIFIED HEARING LOSS TYPE, UNSPECIFIED LATERALITY: ICD-10-CM

## 2024-06-14 NOTE — PROGRESS NOTES
Jacqui Mcfadden   1945, 79 y.o. female   [id number]       Referring Provider: Jose Raul Sherman DO   Referral Type: In an order in Epic    Reason for Visit: Evaluation of suspected change in hearing, tinnitus, or balance.    ADULT AUDIOLOGIC EVALUATION      Jacqui Mcfadden is a 79 y.o. female seen today, 6/14/2024 , for an initial audiologic evaluation.      IMPRESSIONS:      Otoscopy revealed occluding cerumen in the right ear. Attempts to remove it were unsuccessful. Testing was discontinued until she could have her ear cleaned. She will be rescheduled for an ear cleaning first, then a hearing test.     Follow medical recommendations of Jose Raul Sherman DO .     ASSESSMENT AND FINDINGS:     Otoscopy revealed occluded cerumen in the right ear .                                     RECOMMENDATIONS:                                                                                                                                                                                                                                                          The following items are recommended based on patient report and results from today's appointment:   - Continue medical follow-up with Jose Raul Sherman DO .   - Return for a hearing test following an ear cleaning by ENT.       Mamta Benites  Audiologist     Chart CC'd to: Jose Raul Shreman DO

## 2024-06-14 NOTE — PROGRESS NOTES
Jacqui Mcfadden   1945, 79 y.o. female   [id number]       Referring Provider: Jose Raul Sherman DO   Referral Type: In an order in Epic    Reason for Visit: Evaluation of suspected change in hearing, tinnitus, or balance.    ADULT AUDIOLOGIC EVALUATION      Jacqui Mcfadden is a 79 y.o. female seen today, 6/14/2024 , for an initial audiologic evaluation.      AUDIOLOGIC AND OTHER PERTINENT MEDICAL HISTORY:      Jacqui Mcfadden reports ***.     She denied {SANTIAGO Aud Symptoms:97157}    Date: 6/14/2024     IMPRESSIONS:      Today's results revealed {nbresults:23279}. {SB Word Rec:02587} speech understanding when in quiet. Tympanometry indicates {SB Tymp:11108} {SB Discussion:58259}    Follow medical recommendations of Jose Raul Sherman DO .     ASSESSMENT AND FINDINGS:     Otoscopy {SB Otoscopy List:76046}.    RIGHT EAR:  Hearing Sensitivity: {NBHL2:86020}  Speech Recognition Threshold: *** dB HL  Word Recognition: {SB WRS:67758}, based on NU-6 {nbwrs:68173} at *** dBHL using recorded speech stimuli.    Tympanometry: {Tympanometry:88845}       LEFT EAR:  Hearing Sensitivity: {NBHL2:05077}  Speech Recognition Threshold: *** dB HL  Word Recognition: {SB WRS:23928}, based on NU-6 {nbwrs:86702} at *** dBHL using recorded speech stimuli.    Tympanometry: {Tympanometry:10017}       Reliability: {CS Reliability:48977::\"Good \"}  Transducer: {SANTIAGO Transducer:11863::\"Inserts\"}    See scanned audiogram dated 6/14/2024 for results.        PATIENT EDUCATION:       The following items were discussed with the patient:   { Patient Education Audiology Options:00317::\"- Good Communication Strategies\"}    Educational information was shared in the After Visit Summary.                                                RECOMMENDATIONS:

## 2024-06-24 ENCOUNTER — TELEPHONE (OUTPATIENT)
Dept: CARDIOLOGY CLINIC | Age: 79
End: 2024-06-24

## 2024-06-24 NOTE — TELEPHONE ENCOUNTER
Pt states she had her echo done and is wanting to have her teeth extraction done after her 6 mo. Post-opt.  Pt is asking if echo was ok and ok to schedule the teeth extraction. Please advise.

## 2024-06-26 NOTE — TELEPHONE ENCOUNTER
Called patient echo looks good. Will discuss teeth extraction with DCE next week when he returns from vacation. Patient also asking if she can get trigger finger surgery

## 2024-07-10 RX ORDER — DOCUSATE SODIUM 100 MG/1
100 CAPSULE, LIQUID FILLED ORAL 2 TIMES DAILY
Qty: 60 CAPSULE | Refills: 0 | Status: SHIPPED | OUTPATIENT
Start: 2024-07-10

## 2024-07-10 RX ORDER — OMEPRAZOLE 20 MG/1
20 CAPSULE, DELAYED RELEASE ORAL
Qty: 60 CAPSULE | Refills: 0 | Status: SHIPPED | OUTPATIENT
Start: 2024-07-10

## 2024-07-10 NOTE — TELEPHONE ENCOUNTER
Medication:   Requested Prescriptions     Pending Prescriptions Disp Refills    omeprazole (PRILOSEC) 20 MG delayed release capsule [Pharmacy Med Name: Omeprazole Oral Capsule Delayed Release 20 MG] 60 capsule 0     Sig: Take 1 capsule by mouth every morning (before breakfast)    docusate sodium (COLACE) 100 MG capsule [Pharmacy Med Name: Docusate Sodium Oral Capsule 100 MG] 60 capsule 0     Sig: TAKE 1 CAPSULE BY MOUTH 2 TIMES A DAY        Last Filled:  60.0 05.24.24x2    Patient Phone Number: [phone] (home)     Last appt: 5/24/2024   Next appt: Visit date not found    Last OARRS:        No data to display

## 2024-07-15 ENCOUNTER — OFFICE VISIT (OUTPATIENT)
Dept: ENT CLINIC | Age: 79
End: 2024-07-15
Payer: COMMERCIAL

## 2024-07-15 VITALS
DIASTOLIC BLOOD PRESSURE: 72 MMHG | SYSTOLIC BLOOD PRESSURE: 120 MMHG | WEIGHT: 141 LBS | HEART RATE: 77 BPM | HEIGHT: 62 IN | OXYGEN SATURATION: 95 % | TEMPERATURE: 97.9 F | BODY MASS INDEX: 25.95 KG/M2

## 2024-07-15 DIAGNOSIS — H69.91 DYSFUNCTION OF RIGHT EUSTACHIAN TUBE: Primary | ICD-10-CM

## 2024-07-15 DIAGNOSIS — H91.93 BILATERAL HEARING LOSS, UNSPECIFIED HEARING LOSS TYPE: ICD-10-CM

## 2024-07-15 DIAGNOSIS — J31.0 CHRONIC RHINITIS: ICD-10-CM

## 2024-07-15 DIAGNOSIS — H61.21 IMPACTED CERUMEN OF RIGHT EAR: ICD-10-CM

## 2024-07-15 PROCEDURE — 1123F ACP DISCUSS/DSCN MKR DOCD: CPT | Performed by: STUDENT IN AN ORGANIZED HEALTH CARE EDUCATION/TRAINING PROGRAM

## 2024-07-15 PROCEDURE — 99203 OFFICE O/P NEW LOW 30 MIN: CPT | Performed by: STUDENT IN AN ORGANIZED HEALTH CARE EDUCATION/TRAINING PROGRAM

## 2024-07-15 PROCEDURE — G8419 CALC BMI OUT NRM PARAM NOF/U: HCPCS | Performed by: STUDENT IN AN ORGANIZED HEALTH CARE EDUCATION/TRAINING PROGRAM

## 2024-07-15 PROCEDURE — 1036F TOBACCO NON-USER: CPT | Performed by: STUDENT IN AN ORGANIZED HEALTH CARE EDUCATION/TRAINING PROGRAM

## 2024-07-15 PROCEDURE — 1090F PRES/ABSN URINE INCON ASSESS: CPT | Performed by: STUDENT IN AN ORGANIZED HEALTH CARE EDUCATION/TRAINING PROGRAM

## 2024-07-15 PROCEDURE — G8399 PT W/DXA RESULTS DOCUMENT: HCPCS | Performed by: STUDENT IN AN ORGANIZED HEALTH CARE EDUCATION/TRAINING PROGRAM

## 2024-07-15 PROCEDURE — G8427 DOCREV CUR MEDS BY ELIG CLIN: HCPCS | Performed by: STUDENT IN AN ORGANIZED HEALTH CARE EDUCATION/TRAINING PROGRAM

## 2024-07-15 PROCEDURE — 69210 REMOVE IMPACTED EAR WAX UNI: CPT | Performed by: STUDENT IN AN ORGANIZED HEALTH CARE EDUCATION/TRAINING PROGRAM

## 2024-07-15 RX ORDER — FLUTICASONE PROPIONATE 50 MCG
1 SPRAY, SUSPENSION (ML) NASAL 2 TIMES DAILY
Qty: 16 G | Refills: 1 | Status: SHIPPED | OUTPATIENT
Start: 2024-07-15

## 2024-07-15 NOTE — PROGRESS NOTES
[facility]  DIVISION OF OTOLARYNGOLOGY- HEAD & NECK SURGERY  CONSULT      Jacqui Mcfadden (:  1945) is a 79 y.o. female, here for evaluation of the following chief complaint(s):  Cerumen Impaction (Ear cleaning )      ASSESSMENT/PLAN:  1. Dysfunction of right eustachian tube  2. Impacted cerumen of right ear  3. Chronic rhinitis      This is a very pleasant 79 y.o. female here today for evaluation of the the above-noted complaints.      Right-sided cerumen and dried skin removed from the ear.  Start mineral oil.    Patient is suffering from chronic rhinitis and eustachian tube dysfunction.  Begin fluticasone 1 spray twice daily.    Consider audiogram at next appointment for hearing loss.    Medical Decision Making:  The following items were considered in medical decision making:  Independent review of images  Review / order clinical lab tests  Review / order radiology tests  Decision to obtain old records  Review and summation of old records as accessed through [facility] if applicable    SUBJECTIVE/OBJECTIVE:  HPI    Jacqui Mcfadden is here today for evaluation of ear issues.  The patient states that she has a history of hearing loss.  No recent audiogram.  She also has issues related to popping and clicking in the right ear.  This has been ongoing for many years.  She is on a medication for it.  Nothing makes her symptoms better or worse.  Symptoms are intermittent.  No tinnitus, otalgia, otorrhea or vertigo.    Is a history of neck surgery.  She reportedly had some hoarseness after this.  This was in  and her voice has been changed since then.  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  She has never had a procedure for this.              REVIEW OF SYSTEMS  The following systems were reviewed and revealed the following in addition to any already discussed in the HPI:    PHYSICAL EXAM    GENERAL: No acute distress, alert and oriented, no hoarseness, strong voice  EYES:

## 2024-07-15 NOTE — PROGRESS NOTES
[facility]  DIVISION OF OTOLARYNGOLOGY- HEAD & NECK SURGERY  CONSULT      Jacqui Mcfadden (:  1945) is a 79 y.o. female, here for evaluation of the following chief complaint(s):  Cerumen Impaction (Ear cleaning )      ASSESSMENT/PLAN:  1. Dysfunction of right eustachian tube  2. Impacted cerumen of right ear  3. Chronic rhinitis      This is a very pleasant 79 y.o. female here today for evaluation of the the above-noted complaints.  ***    Medical Decision Making:  The following items were considered in medical decision making:  Independent review of images  Review / order clinical lab tests  Review / order radiology tests  Decision to obtain old records  Review and summation of old records as accessed through [facility] if applicable    SUBJECTIVE/OBJECTIVE:  HPI    Jacqui Mcfadden is here today for evaluation of  ***          REVIEW OF SYSTEMS  The following systems were reviewed and revealed the following in addition to any already discussed in the HPI:    PHYSICAL EXAM    GENERAL: No acute distress, alert and oriented, no hoarseness, strong voice  EYES: EOMI, Anti-icteric  HENT:   Head: Normocephalic and atraumatic.   Face:  Symmetric, facial nerve intact  Right Ear: Normal external ear, normal external auditory canal, intact tympanic membrane with normal mobility and aerated middle ear  Left Ear: Normal external ear, normal external auditory canal, intact tympanic membrane with normal mobility and aerated middle ear  Mouth/Oral Cavity:  normal lips, Uvula is midline, no mucosal lesions, no trismus, *** dentition, normal salivary quality/flow  Oropharynx/Larynx:  normal oropharynx, *** tonsils  Nose:Normal external nasal appearance.  Anterior rhinoscopy shows *** a deviated septum preventing view posteriorly.  *** turbinates.  Normal mucosa   NECK: Normal range of motion, no thyromegaly, trachea is midline, no lymphadenopathy, no neck masses, no crepitus  CHEST: Normal respiratory effort, no

## 2024-08-15 NOTE — TELEPHONE ENCOUNTER
Medication:   Requested Prescriptions     Pending Prescriptions Disp Refills    omeprazole (PRILOSEC) 20 MG delayed release capsule [Pharmacy Med Name: Omeprazole Oral Capsule Delayed Release 20 MG] 60 capsule 0     Sig: Take 1 capsule by mouth every morning before breakfast        Last Filled:  60.0  07.10.2024    Patient Phone Number: [phone] (home)     Last appt: 5/24/2024   Next appt: Visit date not found    Last OARRS:        No data to display

## 2024-08-16 RX ORDER — OMEPRAZOLE 20 MG/1
20 CAPSULE, DELAYED RELEASE ORAL
Qty: 60 CAPSULE | Refills: 0 | Status: SHIPPED | OUTPATIENT
Start: 2024-08-16

## 2024-10-28 RX ORDER — RANOLAZINE 500 MG/1
500 TABLET, EXTENDED RELEASE ORAL 2 TIMES DAILY
Qty: 60 TABLET | Refills: 0 | OUTPATIENT
Start: 2024-10-28

## 2024-10-28 RX ORDER — RANOLAZINE 500 MG/1
500 TABLET, EXTENDED RELEASE ORAL 2 TIMES DAILY
Qty: 60 TABLET | Refills: 0 | Status: SHIPPED | OUTPATIENT
Start: 2024-10-28

## 2024-11-19 RX ORDER — ATORVASTATIN CALCIUM 80 MG/1
80 TABLET, FILM COATED ORAL NIGHTLY
Qty: 30 TABLET | Refills: 5 | Status: SHIPPED | OUTPATIENT
Start: 2024-11-19

## 2024-11-19 NOTE — TELEPHONE ENCOUNTER
Received refill request for  atorvastatin (LIPITOR) 80 MG tablet  from [facility] pharmacy.     Last OV: 2/1/2024 DCE    Next OV: 1/31/2025 NPTS    Last Labs: 1/3/2024 Lipid    Last Filled: 6/5/2024 DCE

## 2024-12-03 RX ORDER — RANOLAZINE 500 MG/1
500 TABLET, EXTENDED RELEASE ORAL 2 TIMES DAILY
Qty: 180 TABLET | Refills: 3 | Status: SHIPPED | OUTPATIENT
Start: 2024-12-03

## 2024-12-03 NOTE — TELEPHONE ENCOUNTER
Received refill request for Lesa Osman from [facility] pharmacy.     Last OV: 02/01/24    Next OV: 01/31/25    Last Labs: Lipids 01/11/24    Last Filled: 10/28/24

## 2024-12-06 ENCOUNTER — HOSPITAL ENCOUNTER (INPATIENT)
Age: 79
LOS: 2 days | Discharge: HOME | DRG: 445 | End: 2024-12-08
Attending: STUDENT IN AN ORGANIZED HEALTH CARE EDUCATION/TRAINING PROGRAM | Admitting: INTERNAL MEDICINE
Payer: COMMERCIAL

## 2024-12-06 ENCOUNTER — APPOINTMENT (OUTPATIENT)
Dept: ULTRASOUND IMAGING | Age: 79
DRG: 445 | End: 2024-12-06
Payer: COMMERCIAL

## 2024-12-06 ENCOUNTER — OFFICE VISIT (OUTPATIENT)
Dept: FAMILY MEDICINE CLINIC | Age: 79
End: 2024-12-06

## 2024-12-06 VITALS
SYSTOLIC BLOOD PRESSURE: 149 MMHG | WEIGHT: 144 LBS | TEMPERATURE: 97.4 F | BODY MASS INDEX: 26.34 KG/M2 | OXYGEN SATURATION: 99 % | HEART RATE: 80 BPM | DIASTOLIC BLOOD PRESSURE: 85 MMHG

## 2024-12-06 DIAGNOSIS — R10.11 RUQ PAIN: ICD-10-CM

## 2024-12-06 DIAGNOSIS — K81.9 CHOLECYSTITIS: ICD-10-CM

## 2024-12-06 DIAGNOSIS — R21 RASH: Primary | ICD-10-CM

## 2024-12-06 DIAGNOSIS — R11.2 NAUSEA AND VOMITING, UNSPECIFIED VOMITING TYPE: ICD-10-CM

## 2024-12-06 DIAGNOSIS — K80.50 BILIARY COLIC: Primary | ICD-10-CM

## 2024-12-06 LAB
ALBUMIN SERPL-MCNC: 4.7 G/DL (ref 3.4–5)
ALBUMIN/GLOB SERPL: 1.5 {RATIO} (ref 1.1–2.2)
ALP SERPL-CCNC: 132 U/L (ref 40–129)
ALT SERPL-CCNC: 13 U/L (ref 10–40)
ANION GAP SERPL CALCULATED.3IONS-SCNC: 15 MMOL/L (ref 3–16)
AST SERPL-CCNC: 24 U/L (ref 15–37)
BASOPHILS # BLD: 0 K/UL (ref 0–0.2)
BASOPHILS NFR BLD: 0.2 %
BILIRUB SERPL-MCNC: 0.8 MG/DL (ref 0–1)
BUN SERPL-MCNC: 12 MG/DL (ref 7–20)
CALCIUM SERPL-MCNC: 10 MG/DL (ref 8.3–10.6)
CHLORIDE SERPL-SCNC: 99 MMOL/L (ref 99–110)
CO2 SERPL-SCNC: 23 MMOL/L (ref 21–32)
CREAT SERPL-MCNC: 0.7 MG/DL (ref 0.6–1.2)
DEPRECATED RDW RBC AUTO: 14.2 % (ref 12.4–15.4)
EOSINOPHIL # BLD: 0 K/UL (ref 0–0.6)
EOSINOPHIL NFR BLD: 0 %
GFR SERPLBLD CREATININE-BSD FMLA CKD-EPI: 87 ML/MIN/{1.73_M2}
GLUCOSE SERPL-MCNC: 133 MG/DL (ref 70–99)
HCT VFR BLD AUTO: 45.5 % (ref 36–48)
HGB BLD-MCNC: 15.6 G/DL (ref 12–16)
LIPASE SERPL-CCNC: 15 U/L (ref 13–60)
LYMPHOCYTES # BLD: 0.9 K/UL (ref 1–5.1)
LYMPHOCYTES NFR BLD: 6.8 %
MCH RBC QN AUTO: 30.1 PG (ref 26–34)
MCHC RBC AUTO-ENTMCNC: 34.2 G/DL (ref 31–36)
MCV RBC AUTO: 88 FL (ref 80–100)
MONOCYTES # BLD: 0.6 K/UL (ref 0–1.3)
MONOCYTES NFR BLD: 4.4 %
NEUTROPHILS # BLD: 12 K/UL (ref 1.7–7.7)
NEUTROPHILS NFR BLD: 88.6 %
PLATELET # BLD AUTO: 213 K/UL (ref 135–450)
PMV BLD AUTO: 7.9 FL (ref 5–10.5)
POTASSIUM SERPL-SCNC: 3.8 MMOL/L (ref 3.5–5.1)
PROT SERPL-MCNC: 7.8 G/DL (ref 6.4–8.2)
RBC # BLD AUTO: 5.17 M/UL (ref 4–5.2)
SODIUM SERPL-SCNC: 137 MMOL/L (ref 136–145)
TROPONIN, HIGH SENSITIVITY: 10 NG/L (ref 0–14)
WBC # BLD AUTO: 13.6 K/UL (ref 4–11)

## 2024-12-06 PROCEDURE — 1200000000 HC SEMI PRIVATE

## 2024-12-06 PROCEDURE — 85025 COMPLETE CBC W/AUTO DIFF WBC: CPT

## 2024-12-06 PROCEDURE — 96376 TX/PRO/DX INJ SAME DRUG ADON: CPT

## 2024-12-06 PROCEDURE — 99285 EMERGENCY DEPT VISIT HI MDM: CPT

## 2024-12-06 PROCEDURE — 96375 TX/PRO/DX INJ NEW DRUG ADDON: CPT

## 2024-12-06 PROCEDURE — 83690 ASSAY OF LIPASE: CPT

## 2024-12-06 PROCEDURE — 6360000002 HC RX W HCPCS: Performed by: STUDENT IN AN ORGANIZED HEALTH CARE EDUCATION/TRAINING PROGRAM

## 2024-12-06 PROCEDURE — 80053 COMPREHEN METABOLIC PANEL: CPT

## 2024-12-06 PROCEDURE — 84484 ASSAY OF TROPONIN QUANT: CPT

## 2024-12-06 PROCEDURE — 96365 THER/PROPH/DIAG IV INF INIT: CPT

## 2024-12-06 PROCEDURE — 93005 ELECTROCARDIOGRAM TRACING: CPT | Performed by: EMERGENCY MEDICINE

## 2024-12-06 PROCEDURE — 76705 ECHO EXAM OF ABDOMEN: CPT

## 2024-12-06 PROCEDURE — 6360000002 HC RX W HCPCS: Performed by: PHYSICIAN ASSISTANT

## 2024-12-06 RX ORDER — ACETAMINOPHEN 325 MG/1
650 TABLET ORAL EVERY 6 HOURS PRN
Status: DISCONTINUED | OUTPATIENT
Start: 2024-12-06 | End: 2024-12-08 | Stop reason: HOSPADM

## 2024-12-06 RX ORDER — SODIUM CHLORIDE 0.9 % (FLUSH) 0.9 %
5-40 SYRINGE (ML) INJECTION EVERY 12 HOURS SCHEDULED
Status: DISCONTINUED | OUTPATIENT
Start: 2024-12-06 | End: 2024-12-08 | Stop reason: HOSPADM

## 2024-12-06 RX ORDER — HYDROMORPHONE HYDROCHLORIDE 1 MG/ML
0.5 INJECTION, SOLUTION INTRAMUSCULAR; INTRAVENOUS; SUBCUTANEOUS ONCE
Status: COMPLETED | OUTPATIENT
Start: 2024-12-06 | End: 2024-12-06

## 2024-12-06 RX ORDER — PANTOPRAZOLE SODIUM 40 MG/1
40 TABLET, DELAYED RELEASE ORAL
Status: DISCONTINUED | OUTPATIENT
Start: 2024-12-07 | End: 2024-12-08 | Stop reason: HOSPADM

## 2024-12-06 RX ORDER — POTASSIUM CHLORIDE 7.45 MG/ML
10 INJECTION INTRAVENOUS PRN
Status: DISCONTINUED | OUTPATIENT
Start: 2024-12-06 | End: 2024-12-08 | Stop reason: HOSPADM

## 2024-12-06 RX ORDER — ACETAMINOPHEN 650 MG/1
650 SUPPOSITORY RECTAL EVERY 6 HOURS PRN
Status: DISCONTINUED | OUTPATIENT
Start: 2024-12-06 | End: 2024-12-08 | Stop reason: HOSPADM

## 2024-12-06 RX ORDER — POTASSIUM CHLORIDE 1500 MG/1
40 TABLET, EXTENDED RELEASE ORAL PRN
Status: DISCONTINUED | OUTPATIENT
Start: 2024-12-06 | End: 2024-12-08 | Stop reason: HOSPADM

## 2024-12-06 RX ORDER — CLOPIDOGREL BISULFATE 75 MG/1
75 TABLET ORAL DAILY
Status: DISCONTINUED | OUTPATIENT
Start: 2024-12-07 | End: 2024-12-08 | Stop reason: HOSPADM

## 2024-12-06 RX ORDER — MAGNESIUM SULFATE IN WATER 40 MG/ML
2000 INJECTION, SOLUTION INTRAVENOUS PRN
Status: DISCONTINUED | OUTPATIENT
Start: 2024-12-06 | End: 2024-12-08 | Stop reason: HOSPADM

## 2024-12-06 RX ORDER — ASPIRIN 81 MG/1
81 TABLET, CHEWABLE ORAL DAILY
Status: DISCONTINUED | OUTPATIENT
Start: 2024-12-07 | End: 2024-12-08 | Stop reason: HOSPADM

## 2024-12-06 RX ORDER — ONDANSETRON 4 MG/1
4 TABLET, ORALLY DISINTEGRATING ORAL EVERY 8 HOURS PRN
Status: DISCONTINUED | OUTPATIENT
Start: 2024-12-06 | End: 2024-12-08 | Stop reason: HOSPADM

## 2024-12-06 RX ORDER — ONDANSETRON 2 MG/ML
4 INJECTION INTRAMUSCULAR; INTRAVENOUS ONCE
Status: COMPLETED | OUTPATIENT
Start: 2024-12-06 | End: 2024-12-06

## 2024-12-06 RX ORDER — ONDANSETRON 2 MG/ML
4 INJECTION INTRAMUSCULAR; INTRAVENOUS EVERY 6 HOURS PRN
Status: DISCONTINUED | OUTPATIENT
Start: 2024-12-06 | End: 2024-12-08 | Stop reason: HOSPADM

## 2024-12-06 RX ORDER — SODIUM CHLORIDE 9 MG/ML
INJECTION, SOLUTION INTRAVENOUS PRN
Status: DISCONTINUED | OUTPATIENT
Start: 2024-12-06 | End: 2024-12-08 | Stop reason: HOSPADM

## 2024-12-06 RX ORDER — ATORVASTATIN CALCIUM 80 MG/1
80 TABLET, FILM COATED ORAL NIGHTLY
Status: DISCONTINUED | OUTPATIENT
Start: 2024-12-06 | End: 2024-12-08 | Stop reason: HOSPADM

## 2024-12-06 RX ORDER — SODIUM CHLORIDE 0.9 % (FLUSH) 0.9 %
5-40 SYRINGE (ML) INJECTION PRN
Status: DISCONTINUED | OUTPATIENT
Start: 2024-12-06 | End: 2024-12-08 | Stop reason: HOSPADM

## 2024-12-06 RX ORDER — POLYETHYLENE GLYCOL 3350 17 G/17G
17 POWDER, FOR SOLUTION ORAL DAILY PRN
Status: DISCONTINUED | OUTPATIENT
Start: 2024-12-06 | End: 2024-12-08 | Stop reason: HOSPADM

## 2024-12-06 RX ORDER — SODIUM CHLORIDE 9 MG/ML
INJECTION, SOLUTION INTRAVENOUS CONTINUOUS
Status: ACTIVE | OUTPATIENT
Start: 2024-12-06 | End: 2024-12-07

## 2024-12-06 RX ORDER — RANOLAZINE 500 MG/1
500 TABLET, EXTENDED RELEASE ORAL 2 TIMES DAILY
Status: DISCONTINUED | OUTPATIENT
Start: 2024-12-06 | End: 2024-12-08 | Stop reason: HOSPADM

## 2024-12-06 RX ORDER — TRIAMCINOLONE ACETONIDE 5 MG/G
CREAM TOPICAL
Qty: 15 G | Refills: 1 | Status: SHIPPED | OUTPATIENT
Start: 2024-12-06 | End: 2024-12-13

## 2024-12-06 RX ADMIN — PIPERACILLIN SODIUM AND TAZOBACTAM SODIUM 4500 MG: 4; .5 INJECTION, SOLUTION INTRAVENOUS at 22:00

## 2024-12-06 RX ADMIN — HYDROMORPHONE HYDROCHLORIDE 0.5 MG: 1 INJECTION, SOLUTION INTRAMUSCULAR; INTRAVENOUS; SUBCUTANEOUS at 20:51

## 2024-12-06 RX ADMIN — ONDANSETRON 4 MG: 2 INJECTION, SOLUTION INTRAMUSCULAR; INTRAVENOUS at 16:56

## 2024-12-06 RX ADMIN — ONDANSETRON 4 MG: 2 INJECTION, SOLUTION INTRAMUSCULAR; INTRAVENOUS at 20:50

## 2024-12-06 ASSESSMENT — PAIN SCALES - GENERAL
PAINLEVEL_OUTOF10: 8
PAINLEVEL_OUTOF10: 9

## 2024-12-06 ASSESSMENT — PAIN DESCRIPTION - LOCATION
LOCATION: ABDOMEN;BACK
LOCATION: BACK;ABDOMEN

## 2024-12-06 ASSESSMENT — LIFESTYLE VARIABLES
HOW MANY STANDARD DRINKS CONTAINING ALCOHOL DO YOU HAVE ON A TYPICAL DAY: PATIENT DOES NOT DRINK
HOW OFTEN DO YOU HAVE A DRINK CONTAINING ALCOHOL: NEVER

## 2024-12-06 ASSESSMENT — PAIN - FUNCTIONAL ASSESSMENT: PAIN_FUNCTIONAL_ASSESSMENT: 0-10

## 2024-12-06 ASSESSMENT — PAIN DESCRIPTION - DESCRIPTORS: DESCRIPTORS: ACHING

## 2024-12-06 NOTE — PROGRESS NOTES
[facility]  Clinic Note    Date: 12/6/2024                                               /Objective:     Chief Complaint   Patient presents with    Back Pain    Nausea    Abdominal Pain       HPI  RUQ pain starting 8 days ago. Seemed to be caused by lifting more that day. Since then has moved across the abdomen to the L side, but it's still worse on the R side. Has also become worse. +nausea and dry heaves starting last night and continuing today. Hasn't been able to eat today. Is achy in quality. Is also throbbing a little. Is constant.     Patient also reports painful rash on the left side of the chin just below the mouth starting about a month ago.  Is not really itchy.  Stable in severity.  Has occasionally swept some clear fluid.         Patient Active Problem List    Diagnosis Date Noted    Acute cholecystitis 12/31/2023    Coronary artery calcification 12/30/2023    Gallstones 12/30/2023    NSTEMI (non-ST elevated myocardial infarction) ([facility]) 12/29/2023       Past Medical History:   Diagnosis Date    CAD (coronary artery disease)     Histoplasmosis     bilateral eyes    Hyperlipidemia     Legally blind     MI (myocardial infarction) ([facility])        Current Outpatient Medications   Medication Sig Dispense Refill    triamcinolone (ARISTOCORT) 0.5 % cream Apply topically 3 times daily. 15 g 1    omeprazole (PRILOSEC) 20 MG delayed release capsule TAKE 1 CAPSULE BY MOUTH IN THE MORNING BEFORE BREAKFAST 60 capsule 0    ranolazine (RANEXA) 500 MG extended release tablet TAKE 1 TABLET BY MOUTH 2 TIMES A  tablet 3    atorvastatin (LIPITOR) 80 MG tablet TAKE 1 TABLET BY MOUTH AT NIGHT 30 tablet 5    docusate sodium (COLACE) 100 MG capsule TAKE 1 CAPSULE BY MOUTH 2 TIMES A DAY 60 capsule 0    clopidogrel (PLAVIX) 75 MG tablet Take 1 tablet by mouth daily 90 tablet 3    aspirin 81 MG chewable tablet Take 1 tablet by mouth daily 30 tablet 3    acetaminophen (TYLENOL) 325 MG tablet Take 2 tablets by

## 2024-12-06 NOTE — ED PROVIDER NOTES
[facility] EMERGENCY DEPARTMENT  EMERGENCY DEPARTMENT ENCOUNTER        Pt Name: Jacqui Mcfadden  MRN: [id number]  Birthdate 1945  Date of evaluation: 12/6/2024  Provider: Toña Oliver PA-C  PCP: Jose Raul Sherman DO  Note Started: 5:14 PM EST 12/6/24       I have seen and evaluated this patient with my supervising physician Fanta Weinberg MD.      CHIEF COMPLAINT       Chief Complaint   Patient presents with    Abdominal Pain     Patient arrives through triage with complaints of abdominal pain. Reports that the pain started Thursday and is sharp on the right, upper quadrant. Reports nausea.        HISTORY OF PRESENT ILLNESS: 1 or more Elements     History From: patient, daughter   Limitations to history : None    Jacqui Mcfadden is a 79 y.o. female who presents for evaluation of right upper quadrant abdominal pain.  Started dull yesterday and more severe today with associated nausea and vomiting.  States that she was told last year that she had gallstones but also had a heart attack at that time.  States that her cholecystitis was treated with antibiotics and she had not had any issues since then until yesterday.  States that it started after eating a bowl of Cheerios.  No diarrhea or bloody stools.  History of appendectomy.  No other abdominal surgeries.  No fevers or chills.  No chest pain or shortness of breath.  She has no other complaints or concerns at this time.    Nursing Notes were all reviewed and agreed with or any disagreements were addressed in the HPI.    REVIEW OF SYSTEMS :      Review of Systems   Constitutional:  Negative for appetite change, chills and fever.   HENT:  Negative for congestion and rhinorrhea.    Respiratory:  Negative for cough, shortness of breath and wheezing.    Cardiovascular:  Negative for chest pain.   Gastrointestinal:  Positive for abdominal pain, nausea and vomiting. Negative for diarrhea.   Genitourinary:  Negative for difficulty 
Abnormal; Notable for the following components:    Glucose 133 (*)     Alkaline Phosphatase 132 (*)     All other components within normal limits   LIPASE   TROPONIN     Medications   ondansetron (ZOFRAN) injection 4 mg (4 mg IntraVENous Given 12/6/24 1656)   HYDROmorphone HCl PF (DILAUDID) injection 0.5 mg (0.5 mg IntraVENous Given 12/6/24 2051)   ondansetron (ZOFRAN) injection 4 mg (4 mg IntraVENous Given 12/6/24 2050)   piperacillin-tazobactam (ZOSYN) 4500 mg in dextrose 100 mL IVPB extended infusion (premix) (4,500 mg IntraVENous New Bag 12/6/24 2200)     79-year-old female presenting with right upper quadrant abdominal pain ongoing for 24 hours.  Known history of gallstones.  Denies chest pain with this.  EKG on arrival is nonischemic and high-sensitivity troponin is reassuring.  I have low suspicion for ACS.  Right upper quadrant ultrasound was obtained showing persistent gallstones and gall wall thickening but equivocal for cholecystitis with negative sonographic Ambriz sign.    CBC: no clinically significant anemia, leukocytosis, thrombocytopenia  BMP: no clinically significant electrolyte derangement or CHANTAL  LFTs showing no evidence of acute hepatic injury, transaminitis or hyperbilirubinemia.  Lipase not suggestive of acute gallstone pancreatitis.  I have started her on IV Zosyn and recommend admission for surgical consultation which she is agreeable to.   I spoke with Dr. Jacobs with general surgery who agrees with plan, will evaluate in a.m.  Likely high risk to operate while patient is on dual antiplatelet therapy in the setting of multiple recent cardiac stents, will need cardiology input regarding her plavix.  Patient Referrals:  No follow-up provider specified.    Discharge Medications:  New Prescriptions    No medications on file       FINAL IMPRESSION  1. Biliary colic    2. Cholecystitis        Blood pressure (!) 157/82, pulse 89, temperature 97.8 °F (36.6 °C), temperature source Oral, resp. rate

## 2024-12-07 LAB
ALBUMIN SERPL-MCNC: 3.8 G/DL (ref 3.4–5)
ALBUMIN/GLOB SERPL: 1.3 {RATIO} (ref 1.1–2.2)
ALP SERPL-CCNC: 108 U/L (ref 40–129)
ALT SERPL-CCNC: 10 U/L (ref 10–40)
ANION GAP SERPL CALCULATED.3IONS-SCNC: 13 MMOL/L (ref 3–16)
AST SERPL-CCNC: 20 U/L (ref 15–37)
BASOPHILS # BLD: 0.1 K/UL (ref 0–0.2)
BASOPHILS NFR BLD: 0.7 %
BILIRUB SERPL-MCNC: 0.8 MG/DL (ref 0–1)
BUN SERPL-MCNC: 14 MG/DL (ref 7–20)
CALCIUM SERPL-MCNC: 9.2 MG/DL (ref 8.3–10.6)
CHLORIDE SERPL-SCNC: 100 MMOL/L (ref 99–110)
CO2 SERPL-SCNC: 22 MMOL/L (ref 21–32)
CREAT SERPL-MCNC: 0.7 MG/DL (ref 0.6–1.2)
DEPRECATED RDW RBC AUTO: 14.6 % (ref 12.4–15.4)
EKG ATRIAL RATE: 83 BPM
EKG DIAGNOSIS: NORMAL
EKG P AXIS: 75 DEGREES
EKG P-R INTERVAL: 204 MS
EKG Q-T INTERVAL: 392 MS
EKG QRS DURATION: 72 MS
EKG QTC CALCULATION (BAZETT): 460 MS
EKG R AXIS: 24 DEGREES
EKG T AXIS: 46 DEGREES
EKG VENTRICULAR RATE: 83 BPM
EOSINOPHIL # BLD: 0 K/UL (ref 0–0.6)
EOSINOPHIL NFR BLD: 0 %
GFR SERPLBLD CREATININE-BSD FMLA CKD-EPI: 87 ML/MIN/{1.73_M2}
GLUCOSE SERPL-MCNC: 109 MG/DL (ref 70–99)
HCT VFR BLD AUTO: 41.4 % (ref 36–48)
HGB BLD-MCNC: 13.7 G/DL (ref 12–16)
LYMPHOCYTES # BLD: 1.4 K/UL (ref 1–5.1)
LYMPHOCYTES NFR BLD: 11.8 %
MAGNESIUM SERPL-MCNC: 2.14 MG/DL (ref 1.8–2.4)
MCH RBC QN AUTO: 29.2 PG (ref 26–34)
MCHC RBC AUTO-ENTMCNC: 33 G/DL (ref 31–36)
MCV RBC AUTO: 88.3 FL (ref 80–100)
MONOCYTES # BLD: 1.5 K/UL (ref 0–1.3)
MONOCYTES NFR BLD: 12.2 %
NEUTROPHILS # BLD: 9 K/UL (ref 1.7–7.7)
NEUTROPHILS NFR BLD: 75.3 %
PLATELET # BLD AUTO: 176 K/UL (ref 135–450)
PMV BLD AUTO: 7.9 FL (ref 5–10.5)
POTASSIUM SERPL-SCNC: 3.4 MMOL/L (ref 3.5–5.1)
PROT SERPL-MCNC: 6.7 G/DL (ref 6.4–8.2)
RBC # BLD AUTO: 4.68 M/UL (ref 4–5.2)
SODIUM SERPL-SCNC: 135 MMOL/L (ref 136–145)
WBC # BLD AUTO: 12 K/UL (ref 4–11)

## 2024-12-07 PROCEDURE — 6360000002 HC RX W HCPCS: Performed by: NURSE PRACTITIONER

## 2024-12-07 PROCEDURE — 93010 ELECTROCARDIOGRAM REPORT: CPT | Performed by: INTERNAL MEDICINE

## 2024-12-07 PROCEDURE — 2580000003 HC RX 258: Performed by: INTERNAL MEDICINE

## 2024-12-07 PROCEDURE — 6370000000 HC RX 637 (ALT 250 FOR IP): Performed by: INTERNAL MEDICINE

## 2024-12-07 PROCEDURE — 85025 COMPLETE CBC W/AUTO DIFF WBC: CPT

## 2024-12-07 PROCEDURE — 87040 BLOOD CULTURE FOR BACTERIA: CPT

## 2024-12-07 PROCEDURE — 1200000000 HC SEMI PRIVATE

## 2024-12-07 PROCEDURE — 6360000002 HC RX W HCPCS: Performed by: INTERNAL MEDICINE

## 2024-12-07 PROCEDURE — 83735 ASSAY OF MAGNESIUM: CPT

## 2024-12-07 PROCEDURE — 99222 1ST HOSP IP/OBS MODERATE 55: CPT | Performed by: SURGERY

## 2024-12-07 PROCEDURE — 36415 COLL VENOUS BLD VENIPUNCTURE: CPT

## 2024-12-07 PROCEDURE — 80053 COMPREHEN METABOLIC PANEL: CPT

## 2024-12-07 RX ORDER — ENOXAPARIN SODIUM 100 MG/ML
40 INJECTION SUBCUTANEOUS DAILY
Status: DISCONTINUED | OUTPATIENT
Start: 2024-12-07 | End: 2024-12-08 | Stop reason: HOSPADM

## 2024-12-07 RX ORDER — PROCHLORPERAZINE EDISYLATE 5 MG/ML
10 INJECTION INTRAMUSCULAR; INTRAVENOUS EVERY 6 HOURS PRN
Status: DISCONTINUED | OUTPATIENT
Start: 2024-12-07 | End: 2024-12-08 | Stop reason: HOSPADM

## 2024-12-07 RX ADMIN — ATORVASTATIN CALCIUM 80 MG: 80 TABLET, FILM COATED ORAL at 21:35

## 2024-12-07 RX ADMIN — RANOLAZINE 500 MG: 500 TABLET, EXTENDED RELEASE ORAL at 21:36

## 2024-12-07 RX ADMIN — SODIUM CHLORIDE, PRESERVATIVE FREE 10 ML: 5 INJECTION INTRAVENOUS at 00:01

## 2024-12-07 RX ADMIN — PIPERACILLIN SODIUM AND TAZOBACTAM SODIUM 3375 MG: 3; .375 INJECTION, SOLUTION INTRAVENOUS at 11:34

## 2024-12-07 RX ADMIN — PIPERACILLIN SODIUM AND TAZOBACTAM SODIUM 3375 MG: 3; .375 INJECTION, SOLUTION INTRAVENOUS at 21:31

## 2024-12-07 RX ADMIN — SODIUM CHLORIDE, PRESERVATIVE FREE 10 ML: 5 INJECTION INTRAVENOUS at 07:46

## 2024-12-07 RX ADMIN — SODIUM CHLORIDE: 9 INJECTION, SOLUTION INTRAVENOUS at 00:00

## 2024-12-07 RX ADMIN — PROCHLORPERAZINE EDISYLATE 10 MG: 5 INJECTION INTRAMUSCULAR; INTRAVENOUS at 00:56

## 2024-12-07 RX ADMIN — RANOLAZINE 500 MG: 500 TABLET, EXTENDED RELEASE ORAL at 07:44

## 2024-12-07 RX ADMIN — PIPERACILLIN SODIUM AND TAZOBACTAM SODIUM 3375 MG: 3; .375 INJECTION, SOLUTION INTRAVENOUS at 04:46

## 2024-12-07 RX ADMIN — ENOXAPARIN SODIUM 40 MG: 100 INJECTION SUBCUTANEOUS at 12:26

## 2024-12-07 NOTE — ED NOTES
Patient Name: Jacqui Mcfadden  : 1945 79 y.o.  MRN: [id number]  ED Room #: ED-0010/10     Chief complaint:   Chief Complaint   Patient presents with    Abdominal Pain     Patient arrives through triage with complaints of abdominal pain. Reports that the pain started Thursday and is sharp on the right, upper quadrant. Reports nausea.      Hospital Problem/Diagnosis:   Hospital Problems             Last Modified POA    * (Principal) Cholecystitis 2024 Yes         O2 Flow Rate:O2 Device: None (Room air)   (if applicable)  Cardiac Rhythm:   (if applicable)  Active LDA's:   Peripheral IV 24 Right;Anterior Forearm (Active)            How does patient ambulate? Stand by assist    2. How does patient take pills? Unknown, no oral medications were given in the Emergency Department    3. Is patient alert? Alert    4. Is patient oriented? To Person, To Place, To Time, To Situation, and Follows Commands    5.   Patient arrived from:  home  Facility Name: ___________________________________________    6. If patient is disoriented or from a Skill Nursing Facility has family been notified of admission? No    7. Patient belongings? Belongings: Clothing    Disposition of belongings? Kept with Patient     8. Any specific patient or family belongings/needs/dynamics?   a. none    9. Miscellaneous comments/pending orders?  a. See admit orders       If there are any additional questions please reach out to the Emergency Department.

## 2024-12-07 NOTE — H&P
HOSPITALISTS HISTORY AND PHYSICAL    12/6/2024 10:53 PM    Patient Information:  JACQUI QUARLES is a 79 y.o. female [id number]  PCP:  Jose Raul Sherman DO (Tel: [phone] )    Chief complaint:    Chief Complaint   Patient presents with    Abdominal Pain     Patient arrives through triage with complaints of abdominal pain. Reports that the pain started Thursday and is sharp on the right, upper quadrant. Reports nausea.         History of Present Illness:  Jacqui Quarles is a 79 y.o. female with past medical history significant for gallstones and coronary artery disease with 5 stents, who presented with complaints of right upper quadrant abdominal pain present for the past day.  Pain does not radiate.  Nothing seems to make it better.  She denies chest pain.  She denies nausea, vomiting, diarrhea, fever, chills, sick contacts, recent travel, melena, hematochezia.  In the emergency department, vital signs are stable.  Her workup is significant for negative troponin, white blood cell count of 13.6, EKG showing sinus rhythm, normal lipase, gallbladder ultrasound showing gallstones and wall thickening suggesting possible cholecystitis, though there is a negative Ambriz sign.  Radiology recommended HIDA scan.  ED physician placed a consult for general surgery and asked medicine to admit the patient.  She was started on antibiotics.      REVIEW OF SYSTEMS:   All 10 systems reviewed and  are negative except as mentioned in HPI    Past Medical History:   has a past medical history of CAD (coronary artery disease), Histoplasmosis, Hyperlipidemia, Legally blind, and MI (myocardial infarction) ([facility]).     Past Surgical History:   has a past surgical history that includes Neck surgery; Appendectomy; Coronary angioplasty; and Diagnostic Cardiac Cath Lab Procedure.     Medications:  No current facility-administered

## 2024-12-08 VITALS
DIASTOLIC BLOOD PRESSURE: 71 MMHG | WEIGHT: 144 LBS | HEIGHT: 62 IN | SYSTOLIC BLOOD PRESSURE: 109 MMHG | OXYGEN SATURATION: 96 % | HEART RATE: 82 BPM | BODY MASS INDEX: 26.5 KG/M2 | RESPIRATION RATE: 16 BRPM | TEMPERATURE: 98.6 F

## 2024-12-08 LAB
ALBUMIN SERPL-MCNC: 3.5 G/DL (ref 3.4–5)
ALBUMIN/GLOB SERPL: 1.3 {RATIO} (ref 1.1–2.2)
ALP SERPL-CCNC: 92 U/L (ref 40–129)
ALT SERPL-CCNC: 9 U/L (ref 10–40)
ANION GAP SERPL CALCULATED.3IONS-SCNC: 11 MMOL/L (ref 3–16)
APTT BLD: 27.3 SEC (ref 22.1–36.4)
AST SERPL-CCNC: 15 U/L (ref 15–37)
BASOPHILS # BLD: 0.1 K/UL (ref 0–0.2)
BASOPHILS NFR BLD: 0.7 %
BILIRUB SERPL-MCNC: 1 MG/DL (ref 0–1)
BUN SERPL-MCNC: 9 MG/DL (ref 7–20)
CALCIUM SERPL-MCNC: 8.9 MG/DL (ref 8.3–10.6)
CHLORIDE SERPL-SCNC: 102 MMOL/L (ref 99–110)
CO2 SERPL-SCNC: 23 MMOL/L (ref 21–32)
CREAT SERPL-MCNC: 0.7 MG/DL (ref 0.6–1.2)
DEPRECATED RDW RBC AUTO: 14.5 % (ref 12.4–15.4)
EOSINOPHIL # BLD: 0 K/UL (ref 0–0.6)
EOSINOPHIL NFR BLD: 0.5 %
GFR SERPLBLD CREATININE-BSD FMLA CKD-EPI: 87 ML/MIN/{1.73_M2}
GLUCOSE SERPL-MCNC: 109 MG/DL (ref 70–99)
HCT VFR BLD AUTO: 37.6 % (ref 36–48)
HGB BLD-MCNC: 12.9 G/DL (ref 12–16)
INR PPP: 1.1 (ref 0.85–1.15)
LIPASE SERPL-CCNC: 14 U/L (ref 13–60)
LYMPHOCYTES # BLD: 1.7 K/UL (ref 1–5.1)
LYMPHOCYTES NFR BLD: 20 %
MAGNESIUM SERPL-MCNC: 2.16 MG/DL (ref 1.8–2.4)
MCH RBC QN AUTO: 30.2 PG (ref 26–34)
MCHC RBC AUTO-ENTMCNC: 34.4 G/DL (ref 31–36)
MCV RBC AUTO: 87.7 FL (ref 80–100)
MONOCYTES # BLD: 1 K/UL (ref 0–1.3)
MONOCYTES NFR BLD: 12.2 %
NEUTROPHILS # BLD: 5.7 K/UL (ref 1.7–7.7)
NEUTROPHILS NFR BLD: 66.6 %
PHOSPHATE SERPL-MCNC: 3.1 MG/DL (ref 2.5–4.9)
PLATELET # BLD AUTO: 166 K/UL (ref 135–450)
PMV BLD AUTO: 7.8 FL (ref 5–10.5)
POTASSIUM SERPL-SCNC: 3.8 MMOL/L (ref 3.5–5.1)
PREALB SERPL-MCNC: 17.4 MG/DL (ref 20–40)
PROT SERPL-MCNC: 6.3 G/DL (ref 6.4–8.2)
PROTHROMBIN TIME: 14.4 SEC (ref 11.9–14.9)
RBC # BLD AUTO: 4.29 M/UL (ref 4–5.2)
SODIUM SERPL-SCNC: 136 MMOL/L (ref 136–145)
TRANSFERRIN SERPL-MCNC: 196 MG/DL (ref 200–360)
WBC # BLD AUTO: 8.5 K/UL (ref 4–11)

## 2024-12-08 PROCEDURE — 6360000002 HC RX W HCPCS: Performed by: INTERNAL MEDICINE

## 2024-12-08 PROCEDURE — 85730 THROMBOPLASTIN TIME PARTIAL: CPT

## 2024-12-08 PROCEDURE — 84100 ASSAY OF PHOSPHORUS: CPT

## 2024-12-08 PROCEDURE — 83735 ASSAY OF MAGNESIUM: CPT

## 2024-12-08 PROCEDURE — 85610 PROTHROMBIN TIME: CPT

## 2024-12-08 PROCEDURE — 85025 COMPLETE CBC W/AUTO DIFF WBC: CPT

## 2024-12-08 PROCEDURE — 84134 ASSAY OF PREALBUMIN: CPT

## 2024-12-08 PROCEDURE — 80053 COMPREHEN METABOLIC PANEL: CPT

## 2024-12-08 PROCEDURE — 83690 ASSAY OF LIPASE: CPT

## 2024-12-08 PROCEDURE — 6370000000 HC RX 637 (ALT 250 FOR IP): Performed by: INTERNAL MEDICINE

## 2024-12-08 PROCEDURE — 6360000002 HC RX W HCPCS: Performed by: NURSE PRACTITIONER

## 2024-12-08 PROCEDURE — 2580000003 HC RX 258: Performed by: INTERNAL MEDICINE

## 2024-12-08 PROCEDURE — 36415 COLL VENOUS BLD VENIPUNCTURE: CPT

## 2024-12-08 PROCEDURE — 84466 ASSAY OF TRANSFERRIN: CPT

## 2024-12-08 PROCEDURE — 99232 SBSQ HOSP IP/OBS MODERATE 35: CPT | Performed by: SURGERY

## 2024-12-08 RX ADMIN — POTASSIUM CHLORIDE 40 MEQ: 1500 TABLET, EXTENDED RELEASE ORAL at 00:58

## 2024-12-08 RX ADMIN — ENOXAPARIN SODIUM 40 MG: 100 INJECTION SUBCUTANEOUS at 09:26

## 2024-12-08 RX ADMIN — RANOLAZINE 500 MG: 500 TABLET, EXTENDED RELEASE ORAL at 09:25

## 2024-12-08 RX ADMIN — PIPERACILLIN SODIUM AND TAZOBACTAM SODIUM 3375 MG: 3; .375 INJECTION, SOLUTION INTRAVENOUS at 03:41

## 2024-12-08 RX ADMIN — PANTOPRAZOLE SODIUM 40 MG: 40 TABLET, DELAYED RELEASE ORAL at 05:24

## 2024-12-08 RX ADMIN — SODIUM CHLORIDE, PRESERVATIVE FREE 10 ML: 5 INJECTION INTRAVENOUS at 09:26

## 2024-12-08 NOTE — CARE COORDINATION
Discharge Planning:     (CM) reviewed the patient's chart to assess needs. Patient's Readmission Risk Score is 8%. Patient's medical insurance is [employer]. Patient's PCP is Jose Raul Sherman. No needs anticipated, at this time. CM team to follow. Staff to inform CM if additional discharge needs arise.        Electronically signed by SUE Appiah on 12/8/2024 at 10:44 AM

## 2024-12-08 NOTE — PLAN OF CARE
Problem: Discharge Planning  Goal: Discharge to home or other facility with appropriate resources  12/8/2024 0928 by Krista Thorne RN  Outcome: Progressing  12/8/2024 0148 by Indio Szymanski RN  Outcome: Progressing     Problem: Safety - Adult  Goal: Free from fall injury  12/8/2024 0928 by Krista Thorne RN  Outcome: Progressing  12/8/2024 0148 by Indio Szymanski RN  Outcome: Progressing     Problem: ABCDS Injury Assessment  Goal: Absence of physical injury  12/8/2024 0928 by Krista Thorne RN  Outcome: Progressing  12/8/2024 0148 by Indio Szymanski RN  Outcome: Progressing

## 2024-12-08 NOTE — DISCHARGE SUMMARY
as: PLAVIX               Where to Get Your Medications        These medications were sent to [id number] - [place], [place] - [address] - P [phone] - F [phone] [address] [place], [facility] [address]      Phone: [phone]   amoxicillin-clavulanate 875-125 MG per tablet         Discharge recommendations given to patient.  Follow Up.  in 1 week   Disposition.  home  Activity. activity as tolerated  Diet: ADULT ORAL NUTRITION SUPPLEMENT; Breakfast, Lunch, Dinner; Clear Liquid Oral Supplement  ADULT DIET; Regular; Low Fat/Low Chol/High Fiber/2 gm Na      Spent 35 minutes in discharge process.    Signed:  ALVIN Bautista CNP     12/8/2024 7:35 AM

## 2024-12-08 NOTE — PLAN OF CARE
Problem: Discharge Planning  Goal: Discharge to home or other facility with appropriate resources  12/8/2024 1204 by Krista Thorne RN  Outcome: Completed  12/8/2024 0928 by Krista Thorne RN  Outcome: Progressing  12/8/2024 0148 by Indio Szymanski RN  Outcome: Progressing     Problem: Safety - Adult  Goal: Free from fall injury  12/8/2024 1204 by Krista Thorne RN  Outcome: Completed  12/8/2024 0928 by Krista Thorne RN  Outcome: Progressing  12/8/2024 0148 by Indio Szymanski RN  Outcome: Progressing     Problem: ABCDS Injury Assessment  Goal: Absence of physical injury  12/8/2024 1204 by Krista Thorne RN  Outcome: Completed  12/8/2024 0928 by Krista Thorne RN  Outcome: Progressing  12/8/2024 0148 by Indio Szymanski RN  Outcome: Progressing

## 2024-12-08 NOTE — PROGRESS NOTES
[facility]  Progress Note    Pt Name: Jacqui Mcfadden  MRN: [id number]  YOB: 1945  Date of evaluation: 2024    Chief Complaint: abdominal pain      Subjective:    No acute events overnight  Denies pain or nausea  Tolerating diet  Wants to go home    Vital Signs:  Patient Vitals for the past 24 hrs:   BP Temp Temp src Pulse Resp SpO2   24 0915 109/71 98.6 °F (37 °C) Oral 82 16 96 %   24 0044 120/76 98.3 °F (36.8 °C) Oral 83 16 90 %   24 2135 139/80 99.7 °F (37.6 °C) Oral 90 20 92 %      TEMPERATURE HISTORY 24H: Temp (24hrs), Av.9 °F (37.2 °C), Min:98.3 °F (36.8 °C), Max:99.7 °F (37.6 °C)    BLOOD PRESSURE HISTORY: Systolic (36hrs), Av , Min:109 , Max:144    Diastolic (36hrs), Av, Min:71, Max:82      Intake/Output:    I/O last 3 completed shifts:  In: 1806.5 [P.O.:540; I.V.:987.1; IV Piggyback:279.4]  Out: 900 [Urine:900]  No intake/output data recorded.  Drain/tube Output:           Physical Exam:  General: awake, alert, no acute distress  Cardiac: regular rate and rhythm   Pulmonary: clear to auscultation bilaterally   Abdomen: soft, non-distended, non-tender     Labs:  CBC:    Recent Labs     24  1648 24  0613 24  0512   WBC 13.6* 12.0* 8.5   HGB 15.6 13.7 12.9   HCT 45.5 41.4 37.6    176 166     BMP:    Recent Labs     24  1648 24  0613 24  0512    135* 136   K 3.8 3.4* 3.8   CL 99 100 102   CO2 23 22 23   BUN 12 14 9   CREATININE 0.7 0.7 0.7   GLUCOSE 133* 109* 109*     Hepatic:   Recent Labs     24  1648 24  0613 24  0512   AST 24 20 15   ALT 13 10 9*   BILITOT 0.8 0.8 1.0   ALKPHOS 132* 108 92     Amylase: No results for input(s): \"AMYLASE\" in the last 72 hours.  Lipase:   Recent Labs     24  1648 24  0512   LIPASE 15.0 14.0     Mag:      Recent Labs     24  0613 24  0512   MG 2.14 2.16      Phos:     Recent Labs     24  0512 
General surgery consulted per order.     745: Shift assessment completed, VSS on RA, pt A&O x4. Patient laying comfortably in bed, denies any pain or nausea at this time. Scheduled ranolazine given with sip of water per order. POC and education reviewed with the patient. Safety measures in place. All needs met at this time, call light in reach, will continue to monitor.   
Shift assessment completed, VSS on RA, pt A&Ox4. Pt eating breakfast at this time, no c/o pain or nausea at this time. Just waiting to go home per patient. Scheduled med given per MAR, POC and education reviewed with the patient. Safety measures provided. All needs met at this time, call light in reach, will continue to monitor.     1145: Discharge instructions given, all questions answered.     1205: Patient discharged via w/c, daughter at side.   
(141 lb)       General appearance:  Appears comfortable, alert and  Eyes: Sclera clear. Pupils equal.  ENT: Moist oral mucosa. Trachea midline, no adenopathy.  Cardiovascular: Regular rhythm, normal S1, S2. No murmur. No edema in lower extremities  Respiratory: Not using accessory muscles. Good inspiratory effort. Clear to auscultation bilaterally, no wheeze or crackles.   GI: Abdomen soft, no tenderness to light palpation, not distended, normal bowel sounds  Musculoskeletal: No cyanosis in digits, neck supple  Neurology: CN 2-12 grossly intact. No speech or motor deficits  Psych: Normal affect. Alert and oriented in time, place and person  Skin: Warm, dry, normal turgor    Labs and Tests:  CBC:   Recent Labs     12/06/24  1648 12/07/24  0613   WBC 13.6* 12.0*   HGB 15.6 13.7    176     BMP:    Recent Labs     12/06/24  1648 12/07/24  0613    135*   K 3.8 3.4*   CL 99 100   CO2 23 22   BUN 12 14   CREATININE 0.7 0.7   GLUCOSE 133* 109*     Hepatic:   Recent Labs     12/06/24  1648 12/07/24  0613   AST 24 20   ALT 13 10   BILITOT 0.8 0.8   ALKPHOS 132* 108     US GB  IMPRESSION:  Cholelithiasis with gallbladder wall thickening. Negative sonographic  Ambriz's sign. Findings are equivocal for acute cholecystitis. If there is  clinical concern for acute cholecystitis, HIDA scan may be considered.     Interpretation:  PFT 2/9/24   Normal pulmonary function testing with exception of mildly decreased diffusion capacity     Problem List  Principal Problem:    Cholecystitis  Resolved Problems:    * No resolved hospital problems. *       Assessment & Plan:   Previous Abdominal Pain, likely due to cholecystitis:  US reviewed.  Awaiting HIDA, however doubt it will be done today.  Discussed with Dr Jacobs.  Will let her eat clears today and consider possible d/c home in the next 24 hours if tolerating diet.  Continue with IV zosyn ,  if unable to tolerated diet will need 5 day plavix washout , ie surgery on Wednesday

## 2024-12-11 ENCOUNTER — TELEPHONE (OUTPATIENT)
Dept: FAMILY MEDICINE CLINIC | Age: 79
End: 2024-12-11

## 2024-12-11 LAB
BACTERIA BLD CULT ORG #2: NORMAL
BACTERIA BLD CULT: NORMAL

## 2024-12-11 NOTE — TELEPHONE ENCOUNTER
Care Transitions Initial Follow Up Call    Outreach made within 2 business days of discharge: Yes    Patient: Jacqui Mcfadden Patient : 1945   MRN: [id number]  Reason for Admission: Cholecystitis   Discharge Date: 24       Spoke with: Jacqui    Discharge department/facility: [facility] Interactive Patient Contact:  Was patient able to fill all prescriptions: Yes  Was patient instructed to bring all medications to the follow-up visit: Yes  Is patient taking all medications as directed in the discharge summary? Yes  Does patient understand their discharge instructions: Yes  Does patient have questions or concerns that need addressed prior to 7-14 day follow up office visit: no    Additional needs identified to be addressed with provider    No needs identified     Scheduled appointment with PCP within 7-14 days    Follow Up  Future Appointments   Date Time Provider Department Center   2024  9:30 AM Jose Raul Sherman DO [facility]   2024  8:45 AM Robert Cruz MD [facility]   2025  9:00 AM Rosemarie No APRN - CNP  [facility] [place] [place]

## 2024-12-17 ENCOUNTER — OFFICE VISIT (OUTPATIENT)
Dept: FAMILY MEDICINE CLINIC | Age: 79
End: 2024-12-17

## 2024-12-17 VITALS
SYSTOLIC BLOOD PRESSURE: 111 MMHG | DIASTOLIC BLOOD PRESSURE: 68 MMHG | OXYGEN SATURATION: 94 % | HEART RATE: 85 BPM | BODY MASS INDEX: 26.59 KG/M2 | WEIGHT: 145.4 LBS

## 2024-12-17 DIAGNOSIS — Z09 HOSPITAL DISCHARGE FOLLOW-UP: Primary | ICD-10-CM

## 2024-12-17 DIAGNOSIS — K80.20 CALCULUS OF GALLBLADDER WITHOUT CHOLECYSTITIS WITHOUT OBSTRUCTION: ICD-10-CM

## 2024-12-17 NOTE — PROGRESS NOTES
no cervical lymphadenopathy  Pulmonary/Chest: clear to auscultation bilaterally- no wheezes, rales or rhonchi, normal air movement, no respiratory distress  Cardiovascular: normal rate, regular rhythm, normal S1 and S2, no murmurs, rubs, clicks, or gallops, distal pulses intact, no carotid bruits  Abdomen: soft, non-tender, non-distended, normal bowel sounds, no masses or organomegaly  Extremities: no cyanosis, clubbing or edema  Musculoskeletal: normal range of motion, no joint swelling, deformity or tenderness  Neurologic: reflexes normal and symmetric, no cranial nerve deficit, gait, coordination and speech normal      An electronic signature was used to authenticate this note.  --Jose Raul Sherman, DO

## 2024-12-20 ENCOUNTER — OFFICE VISIT (OUTPATIENT)
Dept: SURGERY | Age: 79
End: 2024-12-20
Payer: COMMERCIAL

## 2024-12-20 VITALS
SYSTOLIC BLOOD PRESSURE: 120 MMHG | DIASTOLIC BLOOD PRESSURE: 72 MMHG | WEIGHT: 146 LBS | BODY MASS INDEX: 26.87 KG/M2 | HEIGHT: 62 IN

## 2024-12-20 DIAGNOSIS — K81.0 ACUTE CHOLECYSTITIS: Primary | ICD-10-CM

## 2024-12-20 PROCEDURE — 99214 OFFICE O/P EST MOD 30 MIN: CPT | Performed by: SURGERY

## 2024-12-20 PROCEDURE — 1090F PRES/ABSN URINE INCON ASSESS: CPT | Performed by: SURGERY

## 2024-12-20 PROCEDURE — 1036F TOBACCO NON-USER: CPT | Performed by: SURGERY

## 2024-12-20 PROCEDURE — 1123F ACP DISCUSS/DSCN MKR DOCD: CPT | Performed by: SURGERY

## 2024-12-20 PROCEDURE — G8399 PT W/DXA RESULTS DOCUMENT: HCPCS | Performed by: SURGERY

## 2024-12-20 PROCEDURE — G8419 CALC BMI OUT NRM PARAM NOF/U: HCPCS | Performed by: SURGERY

## 2024-12-20 PROCEDURE — G8484 FLU IMMUNIZE NO ADMIN: HCPCS | Performed by: SURGERY

## 2024-12-20 PROCEDURE — G8427 DOCREV CUR MEDS BY ELIG CLIN: HCPCS | Performed by: SURGERY

## 2024-12-20 PROCEDURE — 1111F DSCHRG MED/CURRENT MED MERGE: CPT | Performed by: SURGERY

## 2024-12-20 NOTE — PROGRESS NOTES
Subjective   Patient ID: Jacqui Mcfadden is a 79 y.o. female.    Chief complaint is right upper quadrant abdominal pain    [id number]-old female seen in follow-up for gallbladder disease    Review of Systems       Objective   Physical Exam  Constitutional:       Appearance: She is well-developed.   HENT:      Head: Normocephalic and atraumatic.      Right Ear: External ear normal.      Left Ear: External ear normal.   Eyes:      Conjunctiva/sclera: Conjunctivae normal.   Cardiovascular:      Rate and Rhythm: Normal rate and regular rhythm.   Pulmonary:      Effort: Pulmonary effort is normal.      Breath sounds: Normal breath sounds.   Abdominal:      General: There is no distension.      Palpations: Abdomen is soft.      Tenderness: There is no abdominal tenderness.   Musculoskeletal:         General: Normal range of motion.      Cervical back: Normal range of motion and neck supple.   Skin:     General: Skin is warm and dry.   Neurological:      Mental Status: She is alert and oriented to person, place, and time.   Psychiatric:         Behavior: Behavior normal.            Assessment   79-year-old female known to me from treatment of acute calculus cholecystitis in January 2024.  She was treated with antibiotics as she was not able to undergo surgery because of the need for dual antiplatelet therapy status post cardiac stenting.  She was admitted to the hospital earlier this month with right upper quadrant abdominal pain.  Imaging showed cholelithiasis with gallbladder wall thickening.  Overall, the gallbladder continues to be symptomatic and needs removal.      Plan   Laparoscopic cholecystectomy the intraoperative cholangiogram. Patient explained the risks,benefits and possible complications including bleeding, bowel injury, cbd injury or hepatic artery injury.                  Robert Cruz MD

## 2024-12-24 ENCOUNTER — PREP FOR PROCEDURE (OUTPATIENT)
Dept: SURGERY | Age: 79
End: 2024-12-24

## 2024-12-24 DIAGNOSIS — K80.10 CHRONIC CHOLECYSTITIS WITH CALCULUS: ICD-10-CM

## 2024-12-26 ENCOUNTER — TELEPHONE (OUTPATIENT)
Dept: CARDIOLOGY CLINIC | Age: 79
End: 2024-12-26

## 2024-12-26 NOTE — TELEPHONE ENCOUNTER
CARDIAC CLEARANCE     What type of procedure are you having?  CHOLECYSTECTOMY     Which physician is performing your procedure?   Dr Cruz    When is your procedure scheduled for?  1/6/25    Where are you having this procedure?  [place] [facility]    Are you taking Blood Thinners? NO   If so what? (Name/dose/frequesncy)     Does the surgeon want you to stop your blood thinner?  If so for how long?    Phone Number and Contact Name for Physicians office: [phone]     Fax number to send information: [phone]

## 2025-01-02 NOTE — PROGRESS NOTES
DATE 1/6/2024___      PLACE __x_ [address]                          TIME _0730__                                             ___ [address]                           Arrival _0600__                                                                                                                                                                                                        Surgeons office to give arrival time _____                                                                                                 If you have not received time contact your surgeon.                                                                                                                              Surgery dates,times and arrival times are subject to change.Please check with your surgeon.  Bring a photo I.D.,insurance card and form of payment if you have a co pay.  Plan for someone 18 years or older to stay on site while you are her and to take you home after surgery.You are not permitted to drive yourself home. You cannot leave via Uber, Lyft, Taxi or Medical Transport by yourself. You must have someone with you. It is strongly suggested that someone stay with you the first 24 hours after anesthesia. Your surgery will be cancelled if you do not have a ride home. A parent or legal guardian guardian must stay with a child until the child is discharged. Please do not bring other children.  A History/Physical is required to be completed and dated within 30 days of your surgery. To be done by PCP __ Surgeon _x__or Hospitalist ___  You may be required to get labs __ EKG __ or a chest Xray ___ prior to surgery. To be done by ____  Nothing to eat or drink after midnight the evening prior to surgery.  If your surgeon requires a bowel prep, follow their instructions.  You may brush your teeth.  Bring a complete list of your medications including vitamins and supplement with the name,dose and frequency.  Take the following

## 2025-01-03 ENCOUNTER — ANESTHESIA EVENT (OUTPATIENT)
Dept: OPERATING ROOM | Age: 80
End: 2025-01-03
Payer: MEDICARE

## 2025-01-06 ENCOUNTER — APPOINTMENT (OUTPATIENT)
Dept: GENERAL RADIOLOGY | Age: 80
End: 2025-01-06
Attending: SURGERY
Payer: MEDICARE

## 2025-01-06 ENCOUNTER — ANESTHESIA (OUTPATIENT)
Dept: OPERATING ROOM | Age: 80
End: 2025-01-06
Payer: MEDICARE

## 2025-01-06 ENCOUNTER — HOSPITAL ENCOUNTER (OUTPATIENT)
Age: 80
Setting detail: OUTPATIENT SURGERY
Discharge: HOME | End: 2025-01-06
Attending: SURGERY | Admitting: SURGERY
Payer: MEDICARE

## 2025-01-06 VITALS
BODY MASS INDEX: 24.87 KG/M2 | WEIGHT: 136 LBS | TEMPERATURE: 97.7 F | RESPIRATION RATE: 17 BRPM | OXYGEN SATURATION: 94 % | SYSTOLIC BLOOD PRESSURE: 103 MMHG | DIASTOLIC BLOOD PRESSURE: 58 MMHG | HEART RATE: 61 BPM

## 2025-01-06 DIAGNOSIS — K80.10 CHRONIC CHOLECYSTITIS WITH CALCULUS: ICD-10-CM

## 2025-01-06 PROCEDURE — 7100000010 HC PHASE II RECOVERY - FIRST 15 MIN: Performed by: SURGERY

## 2025-01-06 PROCEDURE — 2720000010 HC SURG SUPPLY STERILE: Performed by: SURGERY

## 2025-01-06 PROCEDURE — 88304 TISSUE EXAM BY PATHOLOGIST: CPT

## 2025-01-06 PROCEDURE — 2500000003 HC RX 250 WO HCPCS: Performed by: SURGERY

## 2025-01-06 PROCEDURE — 7100000011 HC PHASE II RECOVERY - ADDTL 15 MIN: Performed by: SURGERY

## 2025-01-06 PROCEDURE — 6360000002 HC RX W HCPCS: Performed by: SURGERY

## 2025-01-06 PROCEDURE — 3700000001 HC ADD 15 MINUTES (ANESTHESIA): Performed by: SURGERY

## 2025-01-06 PROCEDURE — 6360000002 HC RX W HCPCS: Performed by: ANESTHESIOLOGY

## 2025-01-06 PROCEDURE — 7100000000 HC PACU RECOVERY - FIRST 15 MIN: Performed by: SURGERY

## 2025-01-06 PROCEDURE — 2500000003 HC RX 250 WO HCPCS: Performed by: ANESTHESIOLOGY

## 2025-01-06 PROCEDURE — 3600000014 HC SURGERY LEVEL 4 ADDTL 15MIN: Performed by: SURGERY

## 2025-01-06 PROCEDURE — 3700000000 HC ANESTHESIA ATTENDED CARE: Performed by: SURGERY

## 2025-01-06 PROCEDURE — 7100000001 HC PACU RECOVERY - ADDTL 15 MIN: Performed by: SURGERY

## 2025-01-06 PROCEDURE — 2580000003 HC RX 258: Performed by: SURGERY

## 2025-01-06 PROCEDURE — 2709999900 HC NON-CHARGEABLE SUPPLY: Performed by: SURGERY

## 2025-01-06 PROCEDURE — 3600000004 HC SURGERY LEVEL 4 BASE: Performed by: SURGERY

## 2025-01-06 RX ORDER — NALOXONE HYDROCHLORIDE 0.4 MG/ML
INJECTION, SOLUTION INTRAMUSCULAR; INTRAVENOUS; SUBCUTANEOUS PRN
Status: DISCONTINUED | OUTPATIENT
Start: 2025-01-06 | End: 2025-01-06 | Stop reason: HOSPADM

## 2025-01-06 RX ORDER — SODIUM CHLORIDE, SODIUM LACTATE, POTASSIUM CHLORIDE, CALCIUM CHLORIDE 600; 310; 30; 20 MG/100ML; MG/100ML; MG/100ML; MG/100ML
INJECTION, SOLUTION INTRAVENOUS CONTINUOUS PRN
Status: COMPLETED | OUTPATIENT
Start: 2025-01-06 | End: 2025-01-06

## 2025-01-06 RX ORDER — FENTANYL CITRATE 50 UG/ML
25 INJECTION, SOLUTION INTRAMUSCULAR; INTRAVENOUS
Status: DISCONTINUED | OUTPATIENT
Start: 2025-01-06 | End: 2025-01-06 | Stop reason: HOSPADM

## 2025-01-06 RX ORDER — BUPIVACAINE HYDROCHLORIDE AND EPINEPHRINE 5; 5 MG/ML; UG/ML
INJECTION, SOLUTION EPIDURAL; INTRACAUDAL; PERINEURAL
Status: COMPLETED | OUTPATIENT
Start: 2025-01-06 | End: 2025-01-06

## 2025-01-06 RX ORDER — KETOROLAC TROMETHAMINE 30 MG/ML
INJECTION, SOLUTION INTRAMUSCULAR; INTRAVENOUS
Status: DISCONTINUED | OUTPATIENT
Start: 2025-01-06 | End: 2025-01-06 | Stop reason: SDUPTHER

## 2025-01-06 RX ORDER — ONDANSETRON 2 MG/ML
4 INJECTION INTRAMUSCULAR; INTRAVENOUS
Status: DISCONTINUED | OUTPATIENT
Start: 2025-01-06 | End: 2025-01-06 | Stop reason: HOSPADM

## 2025-01-06 RX ORDER — LIDOCAINE HYDROCHLORIDE 20 MG/ML
INJECTION, SOLUTION EPIDURAL; INFILTRATION; INTRACAUDAL; PERINEURAL
Status: DISCONTINUED | OUTPATIENT
Start: 2025-01-06 | End: 2025-01-06 | Stop reason: SDUPTHER

## 2025-01-06 RX ORDER — SODIUM CHLORIDE 0.9 % (FLUSH) 0.9 %
5-40 SYRINGE (ML) INJECTION PRN
Status: DISCONTINUED | OUTPATIENT
Start: 2025-01-06 | End: 2025-01-06 | Stop reason: HOSPADM

## 2025-01-06 RX ORDER — SODIUM CHLORIDE 9 MG/ML
INJECTION, SOLUTION INTRAVENOUS PRN
Status: DISCONTINUED | OUTPATIENT
Start: 2025-01-06 | End: 2025-01-06 | Stop reason: HOSPADM

## 2025-01-06 RX ORDER — ACETAMINOPHEN AND CODEINE PHOSPHATE 300; 30 MG/1; MG/1
1-2 TABLET ORAL EVERY 6 HOURS PRN
Qty: 15 TABLET | Refills: 0 | Status: SHIPPED | OUTPATIENT
Start: 2025-01-06 | End: 2025-01-09

## 2025-01-06 RX ORDER — SODIUM CHLORIDE 0.9 % (FLUSH) 0.9 %
5-40 SYRINGE (ML) INJECTION EVERY 12 HOURS SCHEDULED
Status: DISCONTINUED | OUTPATIENT
Start: 2025-01-06 | End: 2025-01-06 | Stop reason: HOSPADM

## 2025-01-06 RX ORDER — FENTANYL CITRATE 50 UG/ML
50 INJECTION, SOLUTION INTRAMUSCULAR; INTRAVENOUS
Status: DISCONTINUED | OUTPATIENT
Start: 2025-01-06 | End: 2025-01-06 | Stop reason: HOSPADM

## 2025-01-06 RX ORDER — DEXAMETHASONE SODIUM PHOSPHATE 4 MG/ML
INJECTION, SOLUTION INTRA-ARTICULAR; INTRALESIONAL; INTRAMUSCULAR; INTRAVENOUS; SOFT TISSUE
Status: DISCONTINUED | OUTPATIENT
Start: 2025-01-06 | End: 2025-01-06 | Stop reason: SDUPTHER

## 2025-01-06 RX ORDER — ONDANSETRON 2 MG/ML
INJECTION INTRAMUSCULAR; INTRAVENOUS
Status: DISCONTINUED | OUTPATIENT
Start: 2025-01-06 | End: 2025-01-06 | Stop reason: SDUPTHER

## 2025-01-06 RX ORDER — FENTANYL CITRATE 50 UG/ML
INJECTION, SOLUTION INTRAMUSCULAR; INTRAVENOUS
Status: DISCONTINUED | OUTPATIENT
Start: 2025-01-06 | End: 2025-01-06 | Stop reason: SDUPTHER

## 2025-01-06 RX ORDER — PROPOFOL 10 MG/ML
INJECTION, EMULSION INTRAVENOUS
Status: DISCONTINUED | OUTPATIENT
Start: 2025-01-06 | End: 2025-01-06 | Stop reason: SDUPTHER

## 2025-01-06 RX ORDER — CEFAZOLIN 2 G/1
INJECTION, POWDER, FOR SOLUTION INTRAMUSCULAR; INTRAVENOUS
Status: DISCONTINUED
Start: 2025-01-06 | End: 2025-01-06 | Stop reason: HOSPADM

## 2025-01-06 RX ORDER — ROCURONIUM BROMIDE 10 MG/ML
INJECTION, SOLUTION INTRAVENOUS
Status: DISCONTINUED | OUTPATIENT
Start: 2025-01-06 | End: 2025-01-06 | Stop reason: SDUPTHER

## 2025-01-06 RX ADMIN — KETOROLAC TROMETHAMINE 15 MG: 60 INJECTION, SOLUTION INTRAMUSCULAR at 08:24

## 2025-01-06 RX ADMIN — PROPOFOL 150 MG: 10 INJECTION, EMULSION INTRAVENOUS at 07:39

## 2025-01-06 RX ADMIN — ROCURONIUM BROMIDE 50 MG: 10 INJECTION, SOLUTION INTRAVENOUS at 07:39

## 2025-01-06 RX ADMIN — CEFAZOLIN 2000 MG: 2 INJECTION, POWDER, FOR SOLUTION INTRAMUSCULAR; INTRAVENOUS at 07:33

## 2025-01-06 RX ADMIN — FENTANYL CITRATE 100 MCG: 50 INJECTION, SOLUTION INTRAMUSCULAR; INTRAVENOUS at 07:39

## 2025-01-06 RX ADMIN — ONDANSETRON 4 MG: 2 INJECTION INTRAMUSCULAR; INTRAVENOUS at 07:39

## 2025-01-06 RX ADMIN — FENTANYL CITRATE 50 MCG: 50 INJECTION, SOLUTION INTRAMUSCULAR; INTRAVENOUS at 07:58

## 2025-01-06 RX ADMIN — DEXAMETHASONE SODIUM PHOSPHATE 4 MG: 4 INJECTION, SOLUTION INTRAMUSCULAR; INTRAVENOUS at 07:39

## 2025-01-06 RX ADMIN — SODIUM CHLORIDE, POTASSIUM CHLORIDE, SODIUM LACTATE AND CALCIUM CHLORIDE: 600; 310; 30; 20 INJECTION, SOLUTION INTRAVENOUS at 07:30

## 2025-01-06 RX ADMIN — LIDOCAINE HYDROCHLORIDE 100 MG: 20 INJECTION, SOLUTION EPIDURAL; INFILTRATION; INTRACAUDAL; PERINEURAL at 07:39

## 2025-01-06 RX ADMIN — SUGAMMADEX 200 MG: 100 INJECTION, SOLUTION INTRAVENOUS at 08:25

## 2025-01-06 RX ADMIN — FENTANYL CITRATE 50 MCG: 50 INJECTION, SOLUTION INTRAMUSCULAR; INTRAVENOUS at 08:05

## 2025-01-06 ASSESSMENT — PAIN - FUNCTIONAL ASSESSMENT: PAIN_FUNCTIONAL_ASSESSMENT: 0-10

## 2025-01-06 ASSESSMENT — ENCOUNTER SYMPTOMS: SHORTNESS OF BREATH: 0

## 2025-01-06 NOTE — H&P
physically or emotionally unsafe where currently live: Not on file     Harm by anyone: Not on file     Emotionally Harmed: Not on file   Housing Stability: Low Risk  (12/7/2024)    Housing Stability Vital Sign     Unable to Pay for Housing in the Last Year: No     Number of Times Moved in the Last Year: 1     Homeless in the Last Year: No       Allergies:   Allergies   Allergen Reactions    Demerol Anaphylaxis    Morphine Anaphylaxis    Percocet [Oxycodone-Acetaminophen] Anaphylaxis    Vicodin [Hydrocodone-Acetaminophen] Anaphylaxis       Prior to Admission medications    Medication Sig Start Date End Date Taking? Authorizing Provider   omeprazole (PRILOSEC) 20 MG delayed release capsule TAKE 1 CAPSULE BY MOUTH IN THE MORNING BEFORE BREAKFAST 12/3/24  Yes Jose Raul Sherman DO   ranolazine (RANEXA) 500 MG extended release tablet TAKE 1 TABLET BY MOUTH 2 TIMES A DAY 12/3/24  Yes Mustapha Rodriguez MD   atorvastatin (LIPITOR) 80 MG tablet TAKE 1 TABLET BY MOUTH AT NIGHT 11/19/24  Yes Mustapha Rodriguez MD       Principal Problem:    Chronic cholecystitis with calculus  Resolved Problems:    * No resolved hospital problems. *      Blood pressure 136/87, pulse 76, temperature 98 °F (36.7 °C), temperature source Oral, resp. rate 16, weight 61.7 kg (136 lb), SpO2 97%.    Review of Systems    Physical Exam  Constitutional:       Appearance: She is well-developed.   HENT:      Head: Normocephalic and atraumatic.      Right Ear: External ear normal.      Left Ear: External ear normal.   Eyes:      Conjunctiva/sclera: Conjunctivae normal.   Cardiovascular:      Rate and Rhythm: Normal rate and regular rhythm.   Pulmonary:      Effort: Pulmonary effort is normal.      Breath sounds: Normal breath sounds.   Musculoskeletal:         General: Normal range of motion.      Cervical back: Normal range of motion and neck supple.   Skin:     General: Skin is warm and dry.   Neurological:      Mental Status: She is alert and

## 2025-01-06 NOTE — PROGRESS NOTES
Pt resting quietly in bed, awakens easily to voice, denies pain. VSS, O2 sats 99% on room air. Incisions to abdomen dry and intact, abdomen soft, ice pack remains in place. Pt seen by anesthesia, phase 1 criteria met.

## 2025-01-06 NOTE — ANESTHESIA POSTPROCEDURE EVALUATION
Department of Anesthesiology  Postprocedure Note    Patient: Jacqui Mcfadden  MRN: [id number]  YOB: 1945  Date of evaluation: 1/6/2025    Procedure Summary       Date: 01/06/25 Room / Location: [address]    Anesthesia Start: 0730 Anesthesia Stop: 0841    Procedure: LAPAROSCOPIC CHOLECYSTECTOMY (Abdomen) Diagnosis:       Chronic cholecystitis with calculus      (Chronic cholecystitis with calculus [K80.10])    Surgeons: Robert Cruz MD Responsible Provider: Lb Sparks MD    Anesthesia Type: general ASA Status: 3            Anesthesia Type: No value filed.    Fabian Phase I: Fabian Score: 9    Fabian Phase II: Fabian Score: 9    Anesthesia Post Evaluation    Patient location during evaluation: PACU  Patient participation: complete - patient participated  Level of consciousness: awake  Airway patency: patent  Nausea & Vomiting: no nausea and no vomiting  Cardiovascular status: hemodynamically stable  Respiratory status: acceptable  Hydration status: stable  Multimodal analgesia pain management approach  Pain management: adequate    No notable events documented.

## 2025-01-06 NOTE — PROGRESS NOTES
Pt arrived from OR to PACU, awakens to voice, denies pain. VSS, O2 sats 97% on room air. Incisions to abdomen dry and intact, abdomen soft, ice pack in place. Will monitor.

## 2025-01-06 NOTE — ANESTHESIA PRE PROCEDURE
Department of Anesthesiology  Preprocedure Note       Name:  Jacqui Mcfadden   Age:  79 y.o.  :  1945                                          MRN:  [id number]         Date:  2025      Surgeon: Surgeon(s):  Robert Cruz MD    Procedure: Procedure(s):  LAPAROSCOPIC CHOLECYSTECTOMY WITH CHOLANGIOGRAM    Medications prior to admission:   Prior to Admission medications    Medication Sig Start Date End Date Taking? Authorizing Provider   omeprazole (PRILOSEC) 20 MG delayed release capsule TAKE 1 CAPSULE BY MOUTH IN THE MORNING BEFORE BREAKFAST 12/3/24   Jose Ralu Sherman DO   ranolazine (RANEXA) 500 MG extended release tablet TAKE 1 TABLET BY MOUTH 2 TIMES A DAY 12/3/24   Mustapha Rodriguez MD   atorvastatin (LIPITOR) 80 MG tablet TAKE 1 TABLET BY MOUTH AT NIGHT 24   Mustapha Rodriguez MD       Current medications:    No current facility-administered medications for this encounter.       Allergies:    Allergies   Allergen Reactions    Demerol Anaphylaxis    Morphine Anaphylaxis    Percocet [Oxycodone-Acetaminophen] Anaphylaxis    Vicodin [Hydrocodone-Acetaminophen] Anaphylaxis       Problem List:    Patient Active Problem List   Diagnosis Code    NSTEMI (non-ST elevated myocardial infarction) ([facility]) I21.4    Coronary artery calcification I25.10    Gallstones K80.20    Acute cholecystitis K81.0    Cholecystitis K81.9    Chronic cholecystitis with calculus K80.10       Past Medical History:        Diagnosis Date    CAD (coronary artery disease)     Histoplasmosis     bilateral eyes    Hyperlipidemia     Legally blind     MI (myocardial infarction) ([facility])        Past Surgical History:        Procedure Laterality Date    APPENDECTOMY      CORONARY ANGIOPLASTY      DIAGNOSTIC CARDIAC CATH LAB PROCEDURE      1/2024 x 5 stents    NECK SURGERY             Social History:    Social History     Tobacco Use    Smoking status: Former     Current packs/day: 1.00     Average packs/day: 1 pack/day

## 2025-01-06 NOTE — OP NOTE
[place] [id number]                            OPERATIVE REPORT      PATIENT NAME: MARY QUARLES             : 1945  MED REC NO: [id number]                      ROOM: OR  ACCOUNT NO: [id number]                       ADMIT DATE: 2025  PROVIDER: Robert Cruz MD      DATE OF PROCEDURE:  2025    SURGEON:  Robert Cruz MD    PREOPERATIVE DIAGNOSIS:  Chronic cholecystitis with cholelithiasis.    POSTOPERATIVE DIAGNOSIS:  Chronic cholecystitis with cholelithiasis.    PROCEDURE PERFORMED:  Laparoscopic cholecystectomy.    ANESTHESIA:  General endotracheal and local.    ESTIMATED BLOOD LOSS:  Minimal.    COMPLICATIONS:  None.    SPECIMEN:  Gallbladder.    OPERATIVE INDICATIONS AND CONSENT:  The patient is a 79-year-old female who I initially met back in 2023.  She had acute calculous cholecystitis, but required dual-antiplatelet therapy because of cardiac stents.  She is now able to be off Plavix and was brought in today for laparoscopic cholecystectomy.  She was explained the risks, benefits, and possible complications.    DESCRIPTION OF PROCEDURE:  The patient was brought to operative suite and placed in supine position on the operative table.  After general endotracheal anesthesia, she was prepped and draped in the usual sterile fashion.    We made a 5 mm transverse incision in the right upper quadrant.  A Veress needle was passed into the peritoneal cavity and after adequate insufflation, a 5 mm Optiview trocar was placed at the site.  There were some omental adhesions in this area, but no bowel adhesions.  A 5-mm trocar was placed above and to the right of the umbilicus.  We then placed an 11 mm subxiphoid trocar, followed by one more 5 mm right upper quadrant trocar.  We inspected the initial trocar site.  We came through an area of omentum, which was adherent to the abdominal wall.

## 2025-01-06 NOTE — BRIEF OP NOTE
Brief Postoperative Note      Patient: Jacqui Mcfadden  YOB: 1945  MRN: [id number]    Date of Procedure: 1/6/2025    Pre-Op Diagnosis Codes:      * Chronic cholecystitis with calculus [K80.10]    Post-Op Diagnosis: Same       Procedure(s):  LAPAROSCOPIC CHOLECYSTECTOMY    Surgeon(s):  Robert Cruz MD    Assistant:  Surgical Assistant: Cole Blackwell RN    Anesthesia: General    Estimated Blood Loss (mL): Minimal    Complications: None    Specimens:   ID Type Source Tests Collected by Time Destination   A : A) GALLBLADDER Tissue Gallbladder SURGICAL PATHOLOGY Robert Cruz MD 1/6/2025 0814        Implants:  * No implants in log *      Drains: * No LDAs found *    Findings:  Infection Present At Time Of Surgery (PATOS) (choose all levels that have infection present):  No infection present  Other Findings: hydrops of gallbladder    Electronically signed by Roebrt Cruz MD on 1/6/2025 at 8:27 AM

## 2025-01-06 NOTE — PROGRESS NOTES
Discharge instructions and new medication prescription reviewed with pt and pts daughter at bedside, both verbalized understanding. Pt safely dressed and transferred self to wheelchair, IV removed without complications. Pt discharged in wheelchair with all belongings to car by LUIS Cihrinos. Pt tolerated well.

## 2025-01-06 NOTE — DISCHARGE INSTRUCTIONS
Postoperative Instructions      Contact information     Office number - [phone]  Office hours are 8 am - 5 pm  Monday - Friday  Contact the doctor on call during the evenings ( 5 pm - 8 am ) or on Weekends for urgent or emergent issues by using the main office number ( [phone] ).  Please hold routine questions until normal business hours.        Wound care    The incisions are closed with dissolvable sutures which are beneath the skin. Surgical glue is then applied to the skin. The glue is purple in color and should be left undisturbed until your follow-up appointment.  No bandages are required over the surgical glue.  It is okay to shower but do not bathe in a bathtub.  Gently wash over the incisions with soap and water and then pat them dry with a towel. Contact the office for redness of the skin surrounding the incision or if there is drainage of pus.        Activities    It is generally okay to go up and down stairs. Please be careful as your gate may be unsteady from the surgery or pain medications.    Driving: Do not drive while on narcotic pain medications or while still under the effect of narcotic pain medications. Make sure that you are moving comfortably and not limited by postoperative pain or weakness that would make it difficult to react in an emergency situation.    Exercise : The main purpose of the activity restrictions is to reduce the risk of developing a hernia at an incision site.                   Do not lift greater than 25 lbs                   Avoid strenuous abdominal exercises- sit ups, core workouts                   Light cardiovascular exercise is generally okay                   Ask your doctor about the length of the exercise restrictions    Follow up    Please call the office for any concerns between the time of surgery and your follow up appointment.  For your convenience, we ask that you schedule your follow up appointment about 2 weeks from the time of surgery at a time

## 2025-01-08 ENCOUNTER — TELEPHONE (OUTPATIENT)
Dept: SURGERY | Age: 80
End: 2025-01-08

## 2025-01-08 NOTE — TELEPHONE ENCOUNTER
Patient advised to take Mirlax until she has BM, then stop taking it. May also take Colace. Call office with any questions or concerns

## 2025-01-08 NOTE — TELEPHONE ENCOUNTER
01/06/24 LAPAROSCOPIC CHOLECYSTECTOMY ,  Pt can't go to the bathroom, and when she tries she wipes and it is bright red.  Last BM  was 6 days ago.  She is really scared and lives alone.    Please Advise

## 2025-01-24 ENCOUNTER — OFFICE VISIT (OUTPATIENT)
Dept: SURGERY | Age: 80
End: 2025-01-24

## 2025-01-24 VITALS — DIASTOLIC BLOOD PRESSURE: 68 MMHG | SYSTOLIC BLOOD PRESSURE: 108 MMHG | BODY MASS INDEX: 26.16 KG/M2 | WEIGHT: 143 LBS

## 2025-01-24 DIAGNOSIS — K80.10 CHRONIC CHOLECYSTITIS WITH CALCULUS: Primary | ICD-10-CM

## 2025-01-24 PROCEDURE — 99024 POSTOP FOLLOW-UP VISIT: CPT | Performed by: SURGERY

## 2025-01-31 ENCOUNTER — OFFICE VISIT (OUTPATIENT)
Dept: CARDIOLOGY CLINIC | Age: 80
End: 2025-01-31
Payer: COMMERCIAL

## 2025-01-31 ENCOUNTER — HOSPITAL ENCOUNTER (OUTPATIENT)
Age: 80
Discharge: HOME | End: 2025-01-31
Payer: MEDICARE

## 2025-01-31 VITALS
SYSTOLIC BLOOD PRESSURE: 92 MMHG | OXYGEN SATURATION: 98 % | HEART RATE: 62 BPM | WEIGHT: 141 LBS | DIASTOLIC BLOOD PRESSURE: 60 MMHG | BODY MASS INDEX: 25.95 KG/M2 | HEIGHT: 62 IN

## 2025-01-31 DIAGNOSIS — I25.10 CORONARY ARTERY DISEASE DUE TO LIPID RICH PLAQUE: Primary | ICD-10-CM

## 2025-01-31 DIAGNOSIS — I25.83 CORONARY ARTERY DISEASE DUE TO LIPID RICH PLAQUE: Primary | ICD-10-CM

## 2025-01-31 DIAGNOSIS — E78.2 MIXED HYPERLIPIDEMIA: ICD-10-CM

## 2025-01-31 DIAGNOSIS — I10 ESSENTIAL HYPERTENSION: ICD-10-CM

## 2025-01-31 LAB
ALBUMIN SERPL-MCNC: 4.3 G/DL (ref 3.4–5)
ALBUMIN/GLOB SERPL: 1.7 {RATIO} (ref 1.1–2.2)
ALP SERPL-CCNC: 114 U/L (ref 40–129)
ALT SERPL-CCNC: 14 U/L (ref 10–40)
ANION GAP SERPL CALCULATED.3IONS-SCNC: 12 MMOL/L (ref 3–16)
AST SERPL-CCNC: 20 U/L (ref 15–37)
BILIRUB SERPL-MCNC: 0.6 MG/DL (ref 0–1)
BUN SERPL-MCNC: 13 MG/DL (ref 7–20)
CALCIUM SERPL-MCNC: 9.5 MG/DL (ref 8.3–10.6)
CHLORIDE SERPL-SCNC: 103 MMOL/L (ref 99–110)
CHOLEST SERPL-MCNC: 143 MG/DL (ref 0–199)
CO2 SERPL-SCNC: 24 MMOL/L (ref 21–32)
CREAT SERPL-MCNC: 0.8 MG/DL (ref 0.6–1.2)
GFR SERPLBLD CREATININE-BSD FMLA CKD-EPI: 74 ML/MIN/{1.73_M2}
GLUCOSE SERPL-MCNC: 99 MG/DL (ref 70–99)
HDLC SERPL-MCNC: 61 MG/DL (ref 40–60)
LDL CHOLESTEROL: 53 MG/DL
POTASSIUM SERPL-SCNC: 4 MMOL/L (ref 3.5–5.1)
PROT SERPL-MCNC: 6.8 G/DL (ref 6.4–8.2)
SODIUM SERPL-SCNC: 139 MMOL/L (ref 136–145)
TRIGL SERPL-MCNC: 143 MG/DL (ref 0–150)
VLDLC SERPL CALC-MCNC: 29 MG/DL

## 2025-01-31 PROCEDURE — 1036F TOBACCO NON-USER: CPT | Performed by: NURSE PRACTITIONER

## 2025-01-31 PROCEDURE — 1123F ACP DISCUSS/DSCN MKR DOCD: CPT | Performed by: NURSE PRACTITIONER

## 2025-01-31 PROCEDURE — 1090F PRES/ABSN URINE INCON ASSESS: CPT | Performed by: NURSE PRACTITIONER

## 2025-01-31 PROCEDURE — 3078F DIAST BP <80 MM HG: CPT | Performed by: NURSE PRACTITIONER

## 2025-01-31 PROCEDURE — 36415 COLL VENOUS BLD VENIPUNCTURE: CPT

## 2025-01-31 PROCEDURE — 3074F SYST BP LT 130 MM HG: CPT | Performed by: NURSE PRACTITIONER

## 2025-01-31 PROCEDURE — G8427 DOCREV CUR MEDS BY ELIG CLIN: HCPCS | Performed by: NURSE PRACTITIONER

## 2025-01-31 PROCEDURE — 1159F MED LIST DOCD IN RCRD: CPT | Performed by: NURSE PRACTITIONER

## 2025-01-31 PROCEDURE — G8399 PT W/DXA RESULTS DOCUMENT: HCPCS | Performed by: NURSE PRACTITIONER

## 2025-01-31 PROCEDURE — 80061 LIPID PANEL: CPT

## 2025-01-31 PROCEDURE — 99214 OFFICE O/P EST MOD 30 MIN: CPT | Performed by: NURSE PRACTITIONER

## 2025-01-31 PROCEDURE — G8419 CALC BMI OUT NRM PARAM NOF/U: HCPCS | Performed by: NURSE PRACTITIONER

## 2025-01-31 PROCEDURE — 80053 COMPREHEN METABOLIC PANEL: CPT

## 2025-01-31 NOTE — PATIENT INSTRUCTIONS
Resume aspirin 81 mg daily     Cholesterol levels today as routine    Appt in one year    You can get omeprazole / Prilosec over-the-counter

## 2025-01-31 NOTE — PROGRESS NOTES
[facility]     Outpatient Follow Up Note    Jaqcui Mcfadden is 79 y.o. female who presents today with a history of CAD s/p PTCA LAD & RCA Jan '24; HTN and hyperlipidemia      CHIEF COMPLAINT / HPI:  Follow Up secondary to coronary artery disease    Subjective:   She denies significant chest pain. There is no SOB/KAPOOR. The patient denies orthopnea/PND. The patient does not have swelling. The patients weight is stable . The patient is not experiencing palpitations or dizziness.     These symptoms show no change since the last OV.   With regard to medication therapy the patient has been compliant with prescribed regimen. They have tolerated therapy to date.     Past Medical History:   Diagnosis Date    CAD (coronary artery disease)     Histoplasmosis     bilateral eyes    Hyperlipidemia     Legally blind     MI (myocardial infarction) ([facility])      Social History:    Social History     Tobacco Use   Smoking Status Former    Current packs/day: 1.00    Average packs/day: 1 pack/day for 1.1 years (1.1 ttl pk-yrs)    Types: Cigarettes    Start date: 12/29/2023    Quit date: 1/1/1975   Smokeless Tobacco Never     Current Medications:  Current Outpatient Medications   Medication Sig Dispense Refill    omeprazole (PRILOSEC) 20 MG delayed release capsule TAKE 1 CAPSULE BY MOUTH IN THE MORNING BEFORE BREAKFAST 60 capsule 0    ranolazine (RANEXA) 500 MG extended release tablet TAKE 1 TABLET BY MOUTH 2 TIMES A  tablet 3    atorvastatin (LIPITOR) 80 MG tablet TAKE 1 TABLET BY MOUTH AT NIGHT 30 tablet 5     No current facility-administered medications for this visit.     REVIEW OF SYSTEMS:    CONSTITUTIONAL: No major weight gain or loss, fatigue, weakness, night sweats or fever.  HEENT: No new vision difficulties or ringing in the ears; diminished central vision d/t histoplasmosis .  RESPIRATORY: No new SOB, PND, orthopnea or cough.   CARDIOVASCULAR: See HPI  GI: No nausea, vomiting, diarrhea, constipation, abdominal

## 2025-02-03 ENCOUNTER — TELEPHONE (OUTPATIENT)
Dept: CARDIOLOGY CLINIC | Age: 80
End: 2025-02-03

## 2025-02-03 RX ORDER — ATORVASTATIN CALCIUM 80 MG/1
80 TABLET, FILM COATED ORAL NIGHTLY
Qty: 90 TABLET | Refills: 3 | Status: SHIPPED | OUTPATIENT
Start: 2025-02-03

## 2025-02-03 NOTE — TELEPHONE ENCOUNTER
----- Message from ALVIN Whitaker CNP sent at 2/3/2025 11:22 AM EST -----  Looks good ; recheck in one year    Atorvastatin refilled for one year

## 2025-02-05 ENCOUNTER — TELEPHONE (OUTPATIENT)
Dept: CARDIOLOGY CLINIC | Age: 80
End: 2025-02-05

## 2025-02-05 NOTE — TELEPHONE ENCOUNTER
Pt is asking for medication clarification and if she should be taking aspirin. Please call to go over.

## 2025-02-05 NOTE — TELEPHONE ENCOUNTER
Spoke with patient,  she is wondering if she should be taking the Clopidogrel?  States she hasn't taken it since DCE stopped it back in July but she may have continued it.   She is taking the ASA daily.

## 2025-02-06 NOTE — TELEPHONE ENCOUNTER
Spoke to pt about message below she verbalized understanding.       Rosemarie No, ALVIN - CNP  [facility] Staff31 minutes ago (3:36 PM)       He said she could have stopped in in Aug '24

## 2025-02-19 RX ORDER — OMEPRAZOLE 20 MG/1
CAPSULE, DELAYED RELEASE ORAL
Qty: 60 CAPSULE | Refills: 0 | Status: SHIPPED | OUTPATIENT
Start: 2025-02-19

## 2025-02-19 NOTE — TELEPHONE ENCOUNTER
Medication:   Requested Prescriptions     Pending Prescriptions Disp Refills    omeprazole (PRILOSEC) 20 MG delayed release capsule [Pharmacy Med Name: Omeprazole Oral Capsule Delayed Release 20 MG] 60 capsule 0     Sig: TAKE 1 CAPSULE BY MOUTH IN THE MORNING BEFORE BREAKFAST        Last Filled:  12/03/24 60 caps 0 refill     Patient Phone Number: [phone] (home)     Last appt: 12/17/2024   Next appt: Visit date not found    Last OARRS:        No data to display

## 2025-03-20 ENCOUNTER — OFFICE VISIT (OUTPATIENT)
Dept: FAMILY MEDICINE CLINIC | Age: 80
End: 2025-03-20
Payer: MEDICARE

## 2025-03-20 VITALS
SYSTOLIC BLOOD PRESSURE: 110 MMHG | BODY MASS INDEX: 26.31 KG/M2 | DIASTOLIC BLOOD PRESSURE: 82 MMHG | WEIGHT: 143 LBS | HEIGHT: 62 IN | OXYGEN SATURATION: 97 % | HEART RATE: 80 BPM

## 2025-03-20 DIAGNOSIS — Z01.818 PRE-OP EXAM: Primary | ICD-10-CM

## 2025-03-20 PROCEDURE — G8399 PT W/DXA RESULTS DOCUMENT: HCPCS | Performed by: FAMILY MEDICINE

## 2025-03-20 PROCEDURE — 99214 OFFICE O/P EST MOD 30 MIN: CPT | Performed by: FAMILY MEDICINE

## 2025-03-20 PROCEDURE — 1159F MED LIST DOCD IN RCRD: CPT | Performed by: FAMILY MEDICINE

## 2025-03-20 PROCEDURE — 1090F PRES/ABSN URINE INCON ASSESS: CPT | Performed by: FAMILY MEDICINE

## 2025-03-20 PROCEDURE — 1036F TOBACCO NON-USER: CPT | Performed by: FAMILY MEDICINE

## 2025-03-20 PROCEDURE — 1160F RVW MEDS BY RX/DR IN RCRD: CPT | Performed by: FAMILY MEDICINE

## 2025-03-20 PROCEDURE — G8427 DOCREV CUR MEDS BY ELIG CLIN: HCPCS | Performed by: FAMILY MEDICINE

## 2025-03-20 PROCEDURE — 93000 ELECTROCARDIOGRAM COMPLETE: CPT | Performed by: FAMILY MEDICINE

## 2025-03-20 PROCEDURE — 1123F ACP DISCUSS/DSCN MKR DOCD: CPT | Performed by: FAMILY MEDICINE

## 2025-03-20 PROCEDURE — G8419 CALC BMI OUT NRM PARAM NOF/U: HCPCS | Performed by: FAMILY MEDICINE

## 2025-03-20 ASSESSMENT — PATIENT HEALTH QUESTIONNAIRE - PHQ9
2. FEELING DOWN, DEPRESSED OR HOPELESS: NOT AT ALL
1. LITTLE INTEREST OR PLEASURE IN DOING THINGS: NOT AT ALL
6. FEELING BAD ABOUT YOURSELF - OR THAT YOU ARE A FAILURE OR HAVE LET YOURSELF OR YOUR FAMILY DOWN: NOT AT ALL
SUM OF ALL RESPONSES TO PHQ QUESTIONS 1-9: 0
10. IF YOU CHECKED OFF ANY PROBLEMS, HOW DIFFICULT HAVE THESE PROBLEMS MADE IT FOR YOU TO DO YOUR WORK, TAKE CARE OF THINGS AT HOME, OR GET ALONG WITH OTHER PEOPLE: NOT DIFFICULT AT ALL
7. TROUBLE CONCENTRATING ON THINGS, SUCH AS READING THE NEWSPAPER OR WATCHING TELEVISION: NOT AT ALL
3. TROUBLE FALLING OR STAYING ASLEEP: NOT AT ALL
5. POOR APPETITE OR OVEREATING: NOT AT ALL
SUM OF ALL RESPONSES TO PHQ QUESTIONS 1-9: 0
SUM OF ALL RESPONSES TO PHQ QUESTIONS 1-9: 0
4. FEELING TIRED OR HAVING LITTLE ENERGY: NOT AT ALL
9. THOUGHTS THAT YOU WOULD BE BETTER OFF DEAD, OR OF HURTING YOURSELF: NOT AT ALL
8. MOVING OR SPEAKING SO SLOWLY THAT OTHER PEOPLE COULD HAVE NOTICED. OR THE OPPOSITE, BEING SO FIGETY OR RESTLESS THAT YOU HAVE BEEN MOVING AROUND A LOT MORE THAN USUAL: NOT AT ALL
SUM OF ALL RESPONSES TO PHQ QUESTIONS 1-9: 0

## 2025-03-20 NOTE — PROGRESS NOTES
tylenol #3  CAD - s/p MI 12-23, s/p stents x 5  Recent lap chol 1-6-25  Per encounter diagnoses  She is medically cleared for surgery and anesthesia.  If preop labs okay  Plan:    Per operating surgeon.  See also orders filed with this encounter, if any.

## 2025-03-24 ENCOUNTER — RESULTS FOLLOW-UP (OUTPATIENT)
Dept: FAMILY MEDICINE CLINIC | Age: 80
End: 2025-03-24

## 2025-03-24 DIAGNOSIS — Z01.818 PRE-OP EXAM: ICD-10-CM

## 2025-03-24 LAB
ANION GAP SERPL CALCULATED.3IONS-SCNC: 11 MMOL/L (ref 3–16)
APTT BLD: 29.7 SEC (ref 22.1–36.4)
BASOPHILS # BLD: 0 K/UL (ref 0–0.2)
BASOPHILS NFR BLD: 0.8 %
BUN SERPL-MCNC: 11 MG/DL (ref 7–20)
CALCIUM SERPL-MCNC: 9.3 MG/DL (ref 8.3–10.6)
CHLORIDE SERPL-SCNC: 102 MMOL/L (ref 99–110)
CO2 SERPL-SCNC: 25 MMOL/L (ref 21–32)
CREAT SERPL-MCNC: 0.8 MG/DL (ref 0.6–1.2)
DEPRECATED RDW RBC AUTO: 14.8 % (ref 12.4–15.4)
EOSINOPHIL # BLD: 0.3 K/UL (ref 0–0.6)
EOSINOPHIL NFR BLD: 4.2 %
GFR SERPLBLD CREATININE-BSD FMLA CKD-EPI: 74 ML/MIN/{1.73_M2}
GLUCOSE SERPL-MCNC: 110 MG/DL (ref 70–99)
HCT VFR BLD AUTO: 42.2 % (ref 36–48)
HGB BLD-MCNC: 14.2 G/DL (ref 12–16)
INR PPP: 0.95 (ref 0.85–1.15)
LYMPHOCYTES # BLD: 1.4 K/UL (ref 1–5.1)
LYMPHOCYTES NFR BLD: 23 %
MCH RBC QN AUTO: 29.6 PG (ref 26–34)
MCHC RBC AUTO-ENTMCNC: 33.7 G/DL (ref 31–36)
MCV RBC AUTO: 87.8 FL (ref 80–100)
MONOCYTES # BLD: 0.5 K/UL (ref 0–1.3)
MONOCYTES NFR BLD: 8 %
NEUTROPHILS # BLD: 4 K/UL (ref 1.7–7.7)
NEUTROPHILS NFR BLD: 64 %
PLATELET # BLD AUTO: 207 K/UL (ref 135–450)
PMV BLD AUTO: 8.6 FL (ref 5–10.5)
POTASSIUM SERPL-SCNC: 3.9 MMOL/L (ref 3.5–5.1)
PROTHROMBIN TIME: 12.8 SEC (ref 11.9–14.9)
RBC # BLD AUTO: 4.8 M/UL (ref 4–5.2)
SODIUM SERPL-SCNC: 138 MMOL/L (ref 136–145)
WBC # BLD AUTO: 6.2 K/UL (ref 4–11)

## 2025-03-25 ENCOUNTER — TELEPHONE (OUTPATIENT)
Dept: FAMILY MEDICINE CLINIC | Age: 80
End: 2025-03-25

## 2025-03-25 DIAGNOSIS — Z01.818 PRE-OP EXAM: Primary | ICD-10-CM

## 2025-03-25 DIAGNOSIS — Z01.818 PRE-OP EXAM: ICD-10-CM

## 2025-03-25 LAB
BACTERIA URNS QL MICRO: ABNORMAL /HPF
BILIRUB UR QL STRIP.AUTO: NEGATIVE
CLARITY UR: CLEAR
COLOR UR: YELLOW
EPI CELLS #/AREA URNS AUTO: 2 /HPF (ref 0–5)
GLUCOSE UR STRIP.AUTO-MCNC: NEGATIVE MG/DL
HGB UR QL STRIP.AUTO: ABNORMAL
HYALINE CASTS #/AREA URNS AUTO: 0 /LPF (ref 0–8)
KETONES UR STRIP.AUTO-MCNC: NEGATIVE MG/DL
LEUKOCYTE ESTERASE UR QL STRIP.AUTO: ABNORMAL
NITRITE UR QL STRIP.AUTO: NEGATIVE
PH UR STRIP.AUTO: 5.5 [PH] (ref 5–8)
PROT UR STRIP.AUTO-MCNC: 30 MG/DL
RBC CLUMPS #/AREA URNS AUTO: 3 /HPF (ref 0–4)
SP GR UR STRIP.AUTO: 1.02 (ref 1–1.03)
UA COMPLETE W REFLEX CULTURE PNL UR: YES
UA DIPSTICK W REFLEX MICRO PNL UR: YES
URN SPEC COLLECT METH UR: ABNORMAL
UROBILINOGEN UR STRIP-ACNC: 1 E.U./DL
WBC #/AREA URNS AUTO: 11 /HPF (ref 0–5)

## 2025-03-26 ENCOUNTER — RESULTS FOLLOW-UP (OUTPATIENT)
Dept: FAMILY MEDICINE CLINIC | Age: 80
End: 2025-03-26

## 2025-03-26 ENCOUNTER — TELEPHONE (OUTPATIENT)
Dept: FAMILY MEDICINE CLINIC | Age: 80
End: 2025-03-26

## 2025-03-26 LAB — BACTERIA UR CULT: NORMAL

## 2025-03-26 RX ORDER — SULFAMETHOXAZOLE AND TRIMETHOPRIM 800; 160 MG/1; MG/1
1 TABLET ORAL 2 TIMES DAILY
Qty: 6 TABLET | Refills: 0 | Status: SHIPPED | OUTPATIENT
Start: 2025-03-26 | End: 2025-03-29

## 2025-03-29 ENCOUNTER — APPOINTMENT (OUTPATIENT)
Dept: CT IMAGING | Age: 80
End: 2025-03-29
Payer: MEDICARE

## 2025-03-29 ENCOUNTER — APPOINTMENT (OUTPATIENT)
Dept: GENERAL RADIOLOGY | Age: 80
End: 2025-03-29
Payer: MEDICARE

## 2025-03-29 ENCOUNTER — HOSPITAL ENCOUNTER (INPATIENT)
Age: 80
LOS: 2 days | Discharge: HOME HEALTH | End: 2025-03-31
Attending: EMERGENCY MEDICINE | Admitting: INTERNAL MEDICINE
Payer: MEDICARE

## 2025-03-29 DIAGNOSIS — R47.1 DYSARTHRIA: Primary | ICD-10-CM

## 2025-03-29 PROBLEM — I63.9 DYSARTHRIA DUE TO ACUTE CEREBROVASCULAR ACCIDENT (CVA) (HCC): Status: ACTIVE | Noted: 2025-03-29

## 2025-03-29 LAB
ALBUMIN SERPL-MCNC: 4.2 G/DL (ref 3.4–5)
ALBUMIN/GLOB SERPL: 1.6 {RATIO} (ref 1.1–2.2)
ALP SERPL-CCNC: 112 U/L (ref 40–129)
ALT SERPL-CCNC: 10 U/L (ref 10–40)
ANION GAP SERPL CALCULATED.3IONS-SCNC: 12 MMOL/L (ref 3–16)
APTT BLD: 26.6 SEC (ref 22.1–36.4)
AST SERPL-CCNC: 21 U/L (ref 15–37)
BACTERIA URNS QL MICRO: NORMAL /HPF
BASOPHILS # BLD: 0 K/UL (ref 0–0.2)
BASOPHILS NFR BLD: 0.8 %
BILIRUB SERPL-MCNC: 0.5 MG/DL (ref 0–1)
BILIRUB UR QL STRIP.AUTO: NEGATIVE
BUN SERPL-MCNC: 9 MG/DL (ref 7–20)
CALCIUM SERPL-MCNC: 9.3 MG/DL (ref 8.3–10.6)
CHLORIDE SERPL-SCNC: 104 MMOL/L (ref 99–110)
CLARITY UR: CLEAR
CO2 SERPL-SCNC: 20 MMOL/L (ref 21–32)
COLOR UR: YELLOW
CREAT SERPL-MCNC: 0.8 MG/DL (ref 0.6–1.2)
DEPRECATED RDW RBC AUTO: 14.7 % (ref 12.4–15.4)
EOSINOPHIL # BLD: 0.2 K/UL (ref 0–0.6)
EOSINOPHIL NFR BLD: 2.7 %
EPI CELLS #/AREA URNS AUTO: 0 /HPF (ref 0–5)
GFR SERPLBLD CREATININE-BSD FMLA CKD-EPI: 74 ML/MIN/{1.73_M2}
GLUCOSE SERPL-MCNC: 108 MG/DL (ref 70–99)
GLUCOSE UR STRIP.AUTO-MCNC: NEGATIVE MG/DL
HCT VFR BLD AUTO: 41.9 % (ref 36–48)
HGB BLD-MCNC: 13.9 G/DL (ref 12–16)
HGB UR QL STRIP.AUTO: NEGATIVE
HYALINE CASTS #/AREA URNS AUTO: 0 /LPF (ref 0–8)
INR PPP: 0.96 (ref 0.85–1.15)
KETONES UR STRIP.AUTO-MCNC: NEGATIVE MG/DL
LEUKOCYTE ESTERASE UR QL STRIP.AUTO: ABNORMAL
LYMPHOCYTES # BLD: 1.3 K/UL (ref 1–5.1)
LYMPHOCYTES NFR BLD: 20.9 %
MCH RBC QN AUTO: 28.8 PG (ref 26–34)
MCHC RBC AUTO-ENTMCNC: 33.2 G/DL (ref 31–36)
MCV RBC AUTO: 86.7 FL (ref 80–100)
MONOCYTES # BLD: 0.5 K/UL (ref 0–1.3)
MONOCYTES NFR BLD: 7.9 %
NEUTROPHILS # BLD: 4.1 K/UL (ref 1.7–7.7)
NEUTROPHILS NFR BLD: 67.7 %
NITRITE UR QL STRIP.AUTO: NEGATIVE
NT-PROBNP SERPL-MCNC: 155 PG/ML (ref 0–449)
PH UR STRIP.AUTO: 7.5 [PH] (ref 5–8)
PLATELET # BLD AUTO: 183 K/UL (ref 135–450)
PMV BLD AUTO: 7.8 FL (ref 5–10.5)
POTASSIUM SERPL-SCNC: 4.1 MMOL/L (ref 3.5–5.1)
PROT SERPL-MCNC: 6.8 G/DL (ref 6.4–8.2)
PROT UR STRIP.AUTO-MCNC: NEGATIVE MG/DL
PROTHROMBIN TIME: 13 SEC (ref 11.9–14.9)
RBC # BLD AUTO: 4.83 M/UL (ref 4–5.2)
RBC CLUMPS #/AREA URNS AUTO: 2 /HPF (ref 0–4)
SODIUM SERPL-SCNC: 136 MMOL/L (ref 136–145)
SP GR UR STRIP.AUTO: >=1.03 (ref 1–1.03)
TROPONIN, HIGH SENSITIVITY: 10 NG/L (ref 0–14)
TROPONIN, HIGH SENSITIVITY: <6 NG/L (ref 0–14)
UA COMPLETE W REFLEX CULTURE PNL UR: ABNORMAL
UA DIPSTICK W REFLEX MICRO PNL UR: YES
URN SPEC COLLECT METH UR: ABNORMAL
UROBILINOGEN UR STRIP-ACNC: 1 E.U./DL
WBC # BLD AUTO: 6.1 K/UL (ref 4–11)
WBC #/AREA URNS AUTO: 0 /HPF (ref 0–5)

## 2025-03-29 PROCEDURE — 70450 CT HEAD/BRAIN W/O DYE: CPT

## 2025-03-29 PROCEDURE — 83880 ASSAY OF NATRIURETIC PEPTIDE: CPT

## 2025-03-29 PROCEDURE — 6370000000 HC RX 637 (ALT 250 FOR IP): Performed by: PHYSICIAN ASSISTANT

## 2025-03-29 PROCEDURE — 85730 THROMBOPLASTIN TIME PARTIAL: CPT

## 2025-03-29 PROCEDURE — 93005 ELECTROCARDIOGRAM TRACING: CPT | Performed by: EMERGENCY MEDICINE

## 2025-03-29 PROCEDURE — 2500000003 HC RX 250 WO HCPCS: Performed by: INTERNAL MEDICINE

## 2025-03-29 PROCEDURE — 81001 URINALYSIS AUTO W/SCOPE: CPT

## 2025-03-29 PROCEDURE — 80053 COMPREHEN METABOLIC PANEL: CPT

## 2025-03-29 PROCEDURE — 85025 COMPLETE CBC W/AUTO DIFF WBC: CPT

## 2025-03-29 PROCEDURE — 2060000000 HC ICU INTERMEDIATE R&B

## 2025-03-29 PROCEDURE — 6370000000 HC RX 637 (ALT 250 FOR IP): Performed by: INTERNAL MEDICINE

## 2025-03-29 PROCEDURE — 71045 X-RAY EXAM CHEST 1 VIEW: CPT

## 2025-03-29 PROCEDURE — 85610 PROTHROMBIN TIME: CPT

## 2025-03-29 PROCEDURE — 36415 COLL VENOUS BLD VENIPUNCTURE: CPT

## 2025-03-29 PROCEDURE — 6360000004 HC RX CONTRAST MEDICATION: Performed by: PHYSICIAN ASSISTANT

## 2025-03-29 PROCEDURE — 70496 CT ANGIOGRAPHY HEAD: CPT

## 2025-03-29 PROCEDURE — 70498 CT ANGIOGRAPHY NECK: CPT

## 2025-03-29 PROCEDURE — 84484 ASSAY OF TROPONIN QUANT: CPT

## 2025-03-29 PROCEDURE — 4A03X5D MEASUREMENT OF ARTERIAL FLOW, INTRACRANIAL, EXTERNAL APPROACH: ICD-10-PCS | Performed by: RADIOLOGY

## 2025-03-29 PROCEDURE — 99285 EMERGENCY DEPT VISIT HI MDM: CPT

## 2025-03-29 RX ORDER — ASPIRIN 81 MG/1
81 TABLET, CHEWABLE ORAL DAILY
Status: DISCONTINUED | OUTPATIENT
Start: 2025-03-30 | End: 2025-03-31 | Stop reason: HOSPADM

## 2025-03-29 RX ORDER — ONDANSETRON 2 MG/ML
4 INJECTION INTRAMUSCULAR; INTRAVENOUS EVERY 6 HOURS PRN
Status: DISCONTINUED | OUTPATIENT
Start: 2025-03-29 | End: 2025-03-31 | Stop reason: HOSPADM

## 2025-03-29 RX ORDER — PANTOPRAZOLE SODIUM 40 MG/1
40 TABLET, DELAYED RELEASE ORAL
Status: DISCONTINUED | OUTPATIENT
Start: 2025-03-30 | End: 2025-03-31 | Stop reason: HOSPADM

## 2025-03-29 RX ORDER — ATORVASTATIN CALCIUM 80 MG/1
80 TABLET, FILM COATED ORAL NIGHTLY
Status: DISCONTINUED | OUTPATIENT
Start: 2025-03-29 | End: 2025-03-31 | Stop reason: HOSPADM

## 2025-03-29 RX ORDER — ENOXAPARIN SODIUM 100 MG/ML
40 INJECTION SUBCUTANEOUS DAILY
Status: DISCONTINUED | OUTPATIENT
Start: 2025-03-29 | End: 2025-03-30

## 2025-03-29 RX ORDER — ASPIRIN 81 MG/1
324 TABLET, CHEWABLE ORAL ONCE
Status: COMPLETED | OUTPATIENT
Start: 2025-03-29 | End: 2025-03-29

## 2025-03-29 RX ORDER — SODIUM CHLORIDE 9 MG/ML
INJECTION, SOLUTION INTRAVENOUS PRN
Status: DISCONTINUED | OUTPATIENT
Start: 2025-03-29 | End: 2025-03-31 | Stop reason: HOSPADM

## 2025-03-29 RX ORDER — SODIUM CHLORIDE 0.9 % (FLUSH) 0.9 %
5-40 SYRINGE (ML) INJECTION EVERY 12 HOURS SCHEDULED
Status: DISCONTINUED | OUTPATIENT
Start: 2025-03-29 | End: 2025-03-31 | Stop reason: HOSPADM

## 2025-03-29 RX ORDER — POLYETHYLENE GLYCOL 3350 17 G/17G
17 POWDER, FOR SOLUTION ORAL DAILY PRN
Status: DISCONTINUED | OUTPATIENT
Start: 2025-03-29 | End: 2025-03-31 | Stop reason: HOSPADM

## 2025-03-29 RX ORDER — ONDANSETRON 4 MG/1
4 TABLET, ORALLY DISINTEGRATING ORAL EVERY 8 HOURS PRN
Status: DISCONTINUED | OUTPATIENT
Start: 2025-03-29 | End: 2025-03-31 | Stop reason: HOSPADM

## 2025-03-29 RX ORDER — IOPAMIDOL 755 MG/ML
75 INJECTION, SOLUTION INTRAVASCULAR
Status: COMPLETED | OUTPATIENT
Start: 2025-03-29 | End: 2025-03-29

## 2025-03-29 RX ORDER — SODIUM CHLORIDE 0.9 % (FLUSH) 0.9 %
5-40 SYRINGE (ML) INJECTION PRN
Status: DISCONTINUED | OUTPATIENT
Start: 2025-03-29 | End: 2025-03-31 | Stop reason: HOSPADM

## 2025-03-29 RX ORDER — ASPIRIN 300 MG/1
300 SUPPOSITORY RECTAL DAILY
Status: DISCONTINUED | OUTPATIENT
Start: 2025-03-30 | End: 2025-03-31 | Stop reason: HOSPADM

## 2025-03-29 RX ORDER — RANOLAZINE 500 MG/1
500 TABLET, EXTENDED RELEASE ORAL 2 TIMES DAILY
Status: DISCONTINUED | OUTPATIENT
Start: 2025-03-29 | End: 2025-03-31 | Stop reason: HOSPADM

## 2025-03-29 RX ADMIN — ASPIRIN 324 MG: 81 TABLET, CHEWABLE ORAL at 15:21

## 2025-03-29 RX ADMIN — RANOLAZINE 500 MG: 500 TABLET, EXTENDED RELEASE ORAL at 21:55

## 2025-03-29 RX ADMIN — SODIUM CHLORIDE, PRESERVATIVE FREE 10 ML: 5 INJECTION INTRAVENOUS at 21:56

## 2025-03-29 RX ADMIN — IOPAMIDOL 75 ML: 755 INJECTION, SOLUTION INTRAVENOUS at 13:48

## 2025-03-29 RX ADMIN — ATORVASTATIN CALCIUM 80 MG: 80 TABLET, FILM COATED ORAL at 21:55

## 2025-03-29 ASSESSMENT — LIFESTYLE VARIABLES
HOW OFTEN DO YOU HAVE A DRINK CONTAINING ALCOHOL: NEVER
HOW MANY STANDARD DRINKS CONTAINING ALCOHOL DO YOU HAVE ON A TYPICAL DAY: PATIENT DOES NOT DRINK

## 2025-03-29 ASSESSMENT — PAIN - FUNCTIONAL ASSESSMENT: PAIN_FUNCTIONAL_ASSESSMENT: 0-10

## 2025-03-29 ASSESSMENT — PAIN SCALES - GENERAL: PAINLEVEL_OUTOF10: 0

## 2025-03-29 NOTE — ED PROVIDER NOTES
[facility] EMERGENCY DEPARTMENT  EMERGENCY DEPARTMENT ENCOUNTER        Pt Name: Jacqui Mcfadden  MRN: [id number]  Birthdate 1945  Date of evaluation: 3/29/2025  Provider: DRAKE Rousseau  PCP: Jose Raul Sherman DO  Note Started: 2:00 PM EDT 3/29/25       I have seen and evaluated this patient with my supervising physician Angie Dukes,*.      CHIEF COMPLAINT       Chief Complaint   Patient presents with    Aphasia     Pt. Reports difficulty speaking, states, \"It feels like my jaw jerks down.\" Pt. Denies symptoms at this time. Reports symptoms have been intermittent since yesterday morning. Symptoms greater than 24 hours.        HISTORY OF PRESENT ILLNESS: 1 or more Elements     History From: Patient/Family  Limitations to history : None    Jacqui Mcfadden is a 80 y.o. female with past medical history of CAD, hyperlipidemia who presents ED with complaint of some dysarthria.  Patient reports she is having dysarthria since last night.  She tells me she thinks it may have been intermittent for the past couple days but constant since last night.  Family member reports they talk to her yesterday and saw her yesterday and she was having no dysarthria or problems with her speech.  She states she was talking on the phone last night and fell like she was having difficulty forming her words.  She reports woke up this morning at 4 AM and had continued symptoms.  Reports has been persistent all morning.  She came to the ED for further evaluation treatment.  No history of previous CVA or TIA.  No blood thinners.  No headache, visual changes or lightheadedness/dizziness.  No weakness to the arms or legs other than some baseline weakness to her left arm from prior brachial plexus injury from surgery.  She does report some tingling to the left leg which again she reports from prior hip surgery.  No new weakness or numbness/tingling throughout.  Able to ambulate here in the emergency department

## 2025-03-29 NOTE — ED PROVIDER NOTES
[facility] Emergency Department      Pt Name: Jacqui Mcfadden  MRN: [id number]  Birthdate 1945  Date of evaluation: 3/29/2025  Provider: ESTELA JENSEN MD  I personally saw Jacqui Mcfadden and made and approved the management plan with the advanced practice provider.  I take responsibility for the patient management.     HPI: Jacqui Mcfadden presented with   Chief Complaint   Patient presents with    Aphasia     Pt. Reports difficulty speaking, states, \"It feels like my jaw jerks down.\" Pt. Denies symptoms at this time. Reports symptoms have been intermittent since yesterday morning. Symptoms greater than 24 hours.      Jacqui Mcfadden has a past medical history of CAD (coronary artery disease), Histoplasmosis, Hyperlipidemia, Legally blind, and MI (myocardial infarction) ([facility]).  She has a past surgical history that includes Neck surgery; Appendectomy; Coronary angioplasty; Diagnostic Cardiac Cath Lab Procedure; and Cholecystectomy, laparoscopic (N/A, 1/6/2025).    No current facility-administered medications on file prior to encounter.     Current Outpatient Medications on File Prior to Encounter   Medication Sig Dispense Refill    sulfamethoxazole-trimethoprim (BACTRIM DS;SEPTRA DS) 800-160 MG per tablet Take 1 tablet by mouth 2 times daily for 3 days 6 tablet 0    omeprazole (PRILOSEC) 20 MG delayed release capsule TAKE 1 CAPSULE BY MOUTH IN THE MORNING BEFORE BREAKFAST 60 capsule 0    atorvastatin (LIPITOR) 80 MG tablet Take 1 tablet by mouth nightly 90 tablet 3    ranolazine (RANEXA) 500 MG extended release tablet TAKE 1 TABLET BY MOUTH 2 TIMES A  tablet 3     PHYSICAL EXAM  Vitals: BP (!) 132/92   Pulse 88   Temp 98.1 °F (36.7 °C) (Oral)   Resp 18   Ht 1.575 m (5' 2\")   Wt 63.4 kg (139 lb 12.8 oz)   SpO2 95%   BMI 25.57 kg/m²   Constitutional:  80 y.o. female alert  HENT:  Atraumatic, oral mucosa moist  Neck:  No visible JVD, supple  Chest/Lungs:  Respiratory effort normal, clear,

## 2025-03-29 NOTE — H&P
Hospital Medicine History & Physical      PCP: Jose Raul Sherman DO    Date of Admission: 3/29/2025    Date of Service: Pt seen/examined on 3/29/2025 and Admitted to Inpatient with expected LOS greater than two midnights due to medical therapy.     Chief Complaint:    Chief Complaint   Patient presents with    Aphasia     Pt. Reports difficulty speaking, states, \"It feels like my jaw jerks down.\" Pt. Denies symptoms at this time. Reports symptoms have been intermittent since yesterday morning. Symptoms greater than 24 hours.          History Of Present Illness:    The patient is a 80 y.o. 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  However symptoms persisted this morning hence presentation for further evaluation.  No headaches, no acute sensory or motor loss.  However she has intermittent stuttering with difficulty saying words extubated during the examination as well.  Patient reports that she is not able to control it.  No prior history of seizures.      In the ER, CT brain was unrevealing of acute pathology  CT angiogram of the head and neck with no large vessel occlusion but noted for moderate V4 stenosis.      Past Medical History:        Diagnosis Date    CAD (coronary artery disease)     Histoplasmosis     bilateral eyes    Hyperlipidemia     Legally blind     MI (myocardial infarction) (HCC)        Past Surgical History:        Procedure Laterality Date    APPENDECTOMY      CHOLECYSTECTOMY, LAPAROSCOPIC N/A 1/6/2025    LAPAROSCOPIC CHOLECYSTECTOMY performed by Robert Cruz MD at [facility] OR    CORONARY ANGIOPLASTY      DIAGNOSTIC CARDIAC CATH LAB PROCEDURE      1/2024 x 5 stents    NECK SURGERY      2007       Medications Prior to Admission:    Prior to Admission medications    Medication Sig Start Date End Date Taking?

## 2025-03-29 NOTE — ED NOTES
Patient Name: Jacqui Mcfadden  : 1945 80 y.o.  MRN: [id number]  ED Room #: ED-0007/07     Chief complaint:   Chief Complaint   Patient presents with    Aphasia     Pt. Reports difficulty speaking, states, \"It feels like my jaw jerks down.\" Pt. Denies symptoms at this time. Reports symptoms have been intermittent since yesterday morning. Symptoms greater than 24 hours.      Hospital Problem/Diagnosis:   Hospital Problems           Last Modified POA    * (Principal) Dysarthria due to acute cerebrovascular accident (CVA) (HCC) 3/29/2025 Yes    CAD (coronary artery disease) 3/29/2025 Yes         O2 Flow Rate:    (if applicable)  Cardiac Rhythm:   (if applicable)  Active LDA's:   Peripheral IV 25 Distal;Right;Ventral Forearm (Active)            How does patient ambulate? Stand by assist    2. How does patient take pills? Whole with Water    3. Is patient alert? Alert    4. Is patient oriented? To Person, To Place, To Time, To Situation, and Follows Commands    5.   Patient arrived from:  home  Facility Name: ___________________________________________    6. If patient is disoriented or from a Skill Nursing Facility has family been notified of admission? No    7. Patient belongings? Belongings: Dentures, Clothing, and Cane    Disposition of belongings? Kept with Patient     8. Any specific patient or family belongings/needs/dynamics?   a. N/a    9. Miscellaneous comments/pending orders?  a. no      If there are any additional questions please reach out to the Emergency Department.

## 2025-03-29 NOTE — PROGRESS NOTES
Advanced Care Planning Note.     Purpose of Encounter: Advanced care planning in light of speech difficulty  Parties In Attendance: Patient, daughter  Decisional Capacity: Yes  Subjective: Patient with stuttering and speech difficulty since yesterday morning concerning for CVA  Objective: CT brain CTA head and neck within moderate right V4 stenosis  Goals of Care Determination: Patient wishes to focus on treatment and return to home  Patient wants full support (CPR, vent, surgery, HD, trach, PEG)  Plan: Aspirin, Lipitor, MRI brain, neurology consult  Code Status: Full code            Time spent on Advanced care Plannin minutes  Advanced Care Planning Documents: Completed advanced directives on chart, daughter is the POA.     Ximena Barrios MD, MD  3/29/2025 3:42 PM

## 2025-03-29 NOTE — PLAN OF CARE
Problem: Chronic Conditions and Co-morbidities  Goal: Patient's chronic conditions and co-morbidity symptoms are monitored and maintained or improved  Outcome: Progressing     Problem: Discharge Planning  Goal: Discharge to home or other facility with appropriate resources  Outcome: Progressing  Flowsheets (Taken 3/29/2025 4828)  Discharge to home or other facility with appropriate resources:   Identify barriers to discharge with patient and caregiver   Refer to discharge planning if patient needs post-hospital services based on physician order or complex needs related to functional status, cognitive ability or social support system   Arrange for needed discharge resources and transportation as appropriate  Plan to dc home with recommended resourced. Has hx of CAD. Compliant with medication regimen

## 2025-03-29 NOTE — ED NOTES
Pt. Told this RN that symptoms began yesterday morning, pt. Confirmed that symptoms have been going on for greater than 24 hours.

## 2025-03-30 LAB
ANION GAP SERPL CALCULATED.3IONS-SCNC: 12 MMOL/L (ref 3–16)
BUN SERPL-MCNC: 10 MG/DL (ref 7–20)
CALCIUM SERPL-MCNC: 9.5 MG/DL (ref 8.3–10.6)
CHLORIDE SERPL-SCNC: 103 MMOL/L (ref 99–110)
CHOLEST SERPL-MCNC: 147 MG/DL (ref 0–199)
CO2 SERPL-SCNC: 23 MMOL/L (ref 21–32)
CREAT SERPL-MCNC: 0.9 MG/DL (ref 0.6–1.2)
DEPRECATED RDW RBC AUTO: 14.9 % (ref 12.4–15.4)
EKG ATRIAL RATE: 89 BPM
EKG DIAGNOSIS: NORMAL
EKG P AXIS: -2 DEGREES
EKG P-R INTERVAL: 172 MS
EKG Q-T INTERVAL: 354 MS
EKG QRS DURATION: 72 MS
EKG QTC CALCULATION (BAZETT): 430 MS
EKG R AXIS: 28 DEGREES
EKG T AXIS: 24 DEGREES
EKG VENTRICULAR RATE: 89 BPM
EST. AVERAGE GLUCOSE BLD GHB EST-MCNC: 116.9 MG/DL
GFR SERPLBLD CREATININE-BSD FMLA CKD-EPI: 65 ML/MIN/{1.73_M2}
GLUCOSE SERPL-MCNC: 93 MG/DL (ref 70–99)
HBA1C MFR BLD: 5.7 %
HCT VFR BLD AUTO: 43.9 % (ref 36–48)
HDLC SERPL-MCNC: 69 MG/DL (ref 40–60)
HGB BLD-MCNC: 14.5 G/DL (ref 12–16)
LDLC SERPL CALC-MCNC: 60 MG/DL
MCH RBC QN AUTO: 28.5 PG (ref 26–34)
MCHC RBC AUTO-ENTMCNC: 33 G/DL (ref 31–36)
MCV RBC AUTO: 86.4 FL (ref 80–100)
PLATELET # BLD AUTO: 191 K/UL (ref 135–450)
PMV BLD AUTO: 7.9 FL (ref 5–10.5)
POTASSIUM SERPL-SCNC: 4.3 MMOL/L (ref 3.5–5.1)
RBC # BLD AUTO: 5.08 M/UL (ref 4–5.2)
SODIUM SERPL-SCNC: 138 MMOL/L (ref 136–145)
TRIGL SERPL-MCNC: 91 MG/DL (ref 0–150)
VLDLC SERPL CALC-MCNC: 18 MG/DL
WBC # BLD AUTO: 6.8 K/UL (ref 4–11)

## 2025-03-30 PROCEDURE — 92610 EVALUATE SWALLOWING FUNCTION: CPT

## 2025-03-30 PROCEDURE — 97530 THERAPEUTIC ACTIVITIES: CPT

## 2025-03-30 PROCEDURE — 6360000002 HC RX W HCPCS: Performed by: INTERNAL MEDICINE

## 2025-03-30 PROCEDURE — 92523 SPEECH SOUND LANG COMPREHEN: CPT

## 2025-03-30 PROCEDURE — 93010 ELECTROCARDIOGRAM REPORT: CPT | Performed by: INTERNAL MEDICINE

## 2025-03-30 PROCEDURE — 97116 GAIT TRAINING THERAPY: CPT

## 2025-03-30 PROCEDURE — 2500000003 HC RX 250 WO HCPCS: Performed by: INTERNAL MEDICINE

## 2025-03-30 PROCEDURE — 97535 SELF CARE MNGMENT TRAINING: CPT

## 2025-03-30 PROCEDURE — 83036 HEMOGLOBIN GLYCOSYLATED A1C: CPT

## 2025-03-30 PROCEDURE — 6370000000 HC RX 637 (ALT 250 FOR IP): Performed by: INTERNAL MEDICINE

## 2025-03-30 PROCEDURE — 85027 COMPLETE CBC AUTOMATED: CPT

## 2025-03-30 PROCEDURE — 80048 BASIC METABOLIC PNL TOTAL CA: CPT

## 2025-03-30 PROCEDURE — 97165 OT EVAL LOW COMPLEX 30 MIN: CPT

## 2025-03-30 PROCEDURE — APPSS60 APP SPLIT SHARED TIME 46-60 MINUTES

## 2025-03-30 PROCEDURE — 80061 LIPID PANEL: CPT

## 2025-03-30 PROCEDURE — 2060000000 HC ICU INTERMEDIATE R&B

## 2025-03-30 PROCEDURE — APPNB30 APP NON BILLABLE TIME 0-30 MINS

## 2025-03-30 PROCEDURE — 36415 COLL VENOUS BLD VENIPUNCTURE: CPT

## 2025-03-30 PROCEDURE — 92526 ORAL FUNCTION THERAPY: CPT

## 2025-03-30 PROCEDURE — G0378 HOSPITAL OBSERVATION PER HR: HCPCS

## 2025-03-30 PROCEDURE — 97161 PT EVAL LOW COMPLEX 20 MIN: CPT

## 2025-03-30 RX ORDER — ENOXAPARIN SODIUM 100 MG/ML
40 INJECTION SUBCUTANEOUS DAILY
Status: DISCONTINUED | OUTPATIENT
Start: 2025-03-31 | End: 2025-03-31 | Stop reason: HOSPADM

## 2025-03-30 RX ORDER — ENOXAPARIN SODIUM 100 MG/ML
1 INJECTION SUBCUTANEOUS 2 TIMES DAILY
Status: DISCONTINUED | OUTPATIENT
Start: 2025-03-30 | End: 2025-03-30

## 2025-03-30 RX ADMIN — PANTOPRAZOLE SODIUM 40 MG: 40 TABLET, DELAYED RELEASE ORAL at 08:46

## 2025-03-30 RX ADMIN — SODIUM CHLORIDE, PRESERVATIVE FREE 10 ML: 5 INJECTION INTRAVENOUS at 21:07

## 2025-03-30 RX ADMIN — RANOLAZINE 500 MG: 500 TABLET, EXTENDED RELEASE ORAL at 08:46

## 2025-03-30 RX ADMIN — ASPIRIN 81 MG: 81 TABLET, CHEWABLE ORAL at 08:47

## 2025-03-30 RX ADMIN — RANOLAZINE 500 MG: 500 TABLET, EXTENDED RELEASE ORAL at 21:06

## 2025-03-30 RX ADMIN — ENOXAPARIN SODIUM 40 MG: 100 INJECTION SUBCUTANEOUS at 08:47

## 2025-03-30 RX ADMIN — SODIUM CHLORIDE, PRESERVATIVE FREE 10 ML: 5 INJECTION INTRAVENOUS at 08:47

## 2025-03-30 RX ADMIN — ATORVASTATIN CALCIUM 80 MG: 80 TABLET, FILM COATED ORAL at 21:06

## 2025-03-30 ASSESSMENT — PAIN SCALES - GENERAL: PAINLEVEL_OUTOF10: 0

## 2025-03-30 NOTE — PROGRESS NOTES
Speech Language Pathology  [facility]  [phone]  SLP Clinical Swallow Evaluation and Speech Language Cognitive Assessment       Patient: Jacqui Mcfadden   : 1945   MRN: [id number]      Evaluation Date: 3/30/2025      Admitting Dx: Dysarthria [R47.1]  Dysarthria due to acute cerebrovascular accident (CVA) ([facility]) [I63.9, R47.1]  Treatment Diagnosis: Expressive Aphasia ,  Dysarthria , Oropharyngeal Dysphagia   Pain: Denies                                  Recommendations      Recommended Diet and Intervention 3/30/2025:  Diet Solids Recommendation:  Regular texture diet  Liquid Consistency Recommendation:  Thin liquids  Recommended form of Meds:   Meds whole with water     Compensatory strategies:   Alternate solids/liquids , Upright as possible with all PO intake , Small bites/sips , Eat/feed slowly    Discharge Recommendations:  No further follow-up appears indicated at this time.     History/Course of Treatment     H&P:   \"The patient is a 80 y.o. 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  However symptoms persisted this morning hence presentation for further evaluation.  No headaches, no acute sensory or motor loss.  However she has intermittent stuttering with difficulty saying words extubated during the examination as well.  Patient reports that she is not able to control it.  No prior history of seizures.       In the ER, CT brain was unrevealing of acute pathology  CT angiogram of the head and neck with no large vessel occlusion but noted for moderate V4 stenosis.\"    Imaging:  Chest X-ray:   3/29/25  IMPRESSION:  No acute cardiopulmonary process.    Head CT:   3/29/25  IMPRESSION:  Atrophy and chronic microvascular disease without acute intracranial  Abnormality    Head

## 2025-03-30 NOTE — CONSULTS
In patient Neurology consult    [facility] Neurology  MD Jacqui Jacome  1945    Date of Service: 3/30/2025    Referring Physician: Ximena Barrios MD      Reason for the consult and CC: Acute aphasia    HPI:   The patient is a 80 y.o.  years old female with past medical history of hypertension, hyperlipidemia, and other medical problem comes to the hospital with acute speech disturbance.  Degree severe duration persistent.  Description slurred speech and having difficulty forming words.  Patient reports symptoms started tonight before coming to the emergency room and upon wakening up this morning she continued to have some thus came to the emergency room.  No noted headaches, vision changes, dizziness, extremity weakness or paresthesias.  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  No prior history of such episodes.  In emergency room CT head showed no acute intracranial abnormality.  CTA showed no LVO and right vertebral artery stenosis.  Stroke team was consulted, TNK was not recommended.  Lab workup mostly unremarkable.  She was given aspirin and admitted to the hospital further evaluation.  Neurology was consulted MRI brain was ordered.  Today the patient reports no recurrence of symptoms.  She feels back to her baseline.  She denies headache, dizziness, dysarthria, dysphagia extremity weakness or paresthesias.  Other review of systems unremarkable       Constitutional:   Vitals:    03/30/25 0419 03/30/25 0840 03/30/25 1139 03/30/25 1543   BP: 101/62 111/77 112/72 131/72   Pulse: 62 81 83 82   Resp: 18 18  22   Temp: 97.8 °F (36.6 °C) 97.8 °F (36.6 °C) 98 °F (36.7 °C) 98.7 °F (37.1 °C)   TempSrc: Oral Axillary Oral Oral   SpO2: 96% 95% 93% 95%   Weight:  62.9 kg (138 lb 9.6 oz)     Height:             I personally reviewed and updated social history, past medical history, medications, allergy, surgical history, and family history as documented  surgery/procedure with associated risk factors:    [x] Prescription drug management  ----------------------------------------------------------------------  [x] High (any 2)  [] Moderate (any 1)    C. Data (any 2 for high and any 1 for moderate)  [x] Discussed management of the case with:    [x] Imaging personally reviewed and interpreted, includes:    [x] Data Review (any 3)  [x] Collateral history obtained from:    [x] All available Consultant notes from yesterday/today were reviewed  [x] All current labs were reviewed and interpreted for clinical significance   [x] Appropriate follow-up labs were ordered    Data: reviewed   LABS:   Lab Results   Component Value Date/Time     03/30/2025 05:18 AM    K 4.3 03/30/2025 05:18 AM     03/30/2025 05:18 AM    CO2 23 03/30/2025 05:18 AM    BUN 10 03/30/2025 05:18 AM    CREATININE 0.9 03/30/2025 05:18 AM    LABGLOM 65 03/30/2025 05:18 AM    LABGLOM >90 04/25/2024 09:22 AM    GLUCOSE 93 03/30/2025 05:18 AM    PHOS 3.1 12/08/2024 05:12 AM    MG 2.16 12/08/2024 05:12 AM    CALCIUM 9.5 03/30/2025 05:18 AM     Lab Results   Component Value Date/Time    WBC 6.8 03/30/2025 05:18 AM    RBC 5.08 03/30/2025 05:18 AM    HGB 14.5 03/30/2025 05:18 AM    HCT 43.9 03/30/2025 05:18 AM    MCV 86.4 03/30/2025 05:18 AM    RDW 14.9 03/30/2025 05:18 AM     03/30/2025 05:18 AM     Lab Results   Component Value Date    INR 0.96 03/29/2025    PROTIME 13.0 03/29/2025       Neuroimaging was independently reviewed by myself and discussed results with the patient  Reviewed notes from different physicians including H&P and ED notes.  Reviewed lab and blood testing    Impression:     Acute aphasia, rule out TIA/CVA.  MRI brain is ordered  Hypertension  Hyperlipidemia      Recommendation:     MRI brain  Echocardiogram   Check lipid panel, TFT, and A1c  Aspirin 81 mg daily  Lipitor 80 mg daily  Blood pressure monitor and control.  PT and OT   Speech   Aspiration precautions  DVT and GI

## 2025-03-30 NOTE — PROGRESS NOTES
Hospitalist Progress Note      PCP: Jose Raul Sherman DO    Date of Admission: 3/29/2025    LOS: 1    Chief Complaint:   Chief Complaint   Patient presents with    Aphasia     Pt. Reports difficulty speaking, states, \"It feels like my jaw jerks down.\" Pt. Denies symptoms at this time. Reports symptoms have been intermittent since yesterday morning. Symptoms greater than 24 hours.        Case Summary:   80 y.o. 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      Active Hospital Problems    Diagnosis Date Noted    Dysarthria due to acute cerebrovascular accident (CVA) (HCC) [I63.9, R47.1] 03/29/2025    CAD (coronary artery disease) [I25.10] 12/30/2023         Principal Problem:    Dysarthria due to acute cerebrovascular accident (CVA) ([facility]): Patient with intermittent dysarthria.  Stuttering that is uncontrollable.  CT brain with no acute pathology.  CT angiogram of the head and neck unremarkable.  - Await MRI brain  - Continue aspirin, statin  - Neurology consult  - PT OT and speech pathology consult    Active Problems:    CAD (coronary artery disease): No chest pains or shortness of breath.  Continue ranolazine, aspirin and statin   GERD: On pantoprazole      Medications:  Reviewed  Infusion Medications    sodium chloride       Scheduled Medications    pantoprazole  40 mg Oral QAM AC    ranolazine  500 mg Oral BID    sodium chloride flush  5-40 mL IntraVENous 2 times per day    enoxaparin  40 mg SubCUTAneous Daily    atorvastatin  80 mg Oral Nightly    aspirin  81 mg Oral Daily    Or    aspirin  300 mg Rectal Daily     PRN Meds: sodium chloride flush, sodium chloride, ondansetron **OR** ondansetron, polyethylene glycol      DVT Prophylaxis: Subcut enoxaparin  Diet: ADULT DIET; Regular  Code Status: Full Code    Dispo: Anticipate discharge tomorrow if remains

## 2025-03-30 NOTE — PROGRESS NOTES
[facility] - Inpatient Rehabilitation Department   Phone: [phone]    Occupational Therapy    [x] Initial Evaluation            [] Daily Treatment Note         [] Discharge Summary      Patient: Jacqui Mcfadden   : 1945   MRN: [id number]   Date of Service:  3/30/2025    Admitting Diagnosis:  Dysarthria due to acute cerebrovascular accident (CVA) (HCC)  Current Admission Summary: Pt is an 81 y/o female who presents to the hospital with dysarthria. CT head: \"Atrophy and chronic microvascular disease without acute intracranial abnormality.\" MRI brain pending.   Past Medical History:  has a past medical history of CAD (coronary artery disease), Histoplasmosis, Hyperlipidemia, Legally blind, and MI (myocardial infarction) ([facility]ly [facility]).  Past Surgical History:  has a past surgical history that includes Neck surgery; Appendectomy; Coronary angioplasty; Diagnostic Cardiac Cath Lab Procedure; and Cholecystectomy, laparoscopic (N/A, 2025).    Discharge Recommendations: Jacqui Mcfadden scored a 19/24 on the AM-PAC ADL Inpatient form. Current research shows that an AM-PAC score of 18 or greater is typically associated with a discharge to the patient's home setting. Based on the patient's AM-PAC score, and their current ADL deficits, it is recommended that the patient have 2-3 sessions per week of Occupational Therapy at d/c to increase the patient's independence.  At this time, this patient demonstrates the endurance and safety to discharge home with home and a follow up treatment frequency of 2-3x/wk.   Please see assessment section for further patient specific details.    If patient discharges prior to next session this note will serve as a discharge summary.  Please see below for the latest assessment towards goals.      HOME HEALTH CARE: LEVEL 1 STANDARD    - Initial home health evaluation to occur within 24-48 hours, in patient home   - Therapy to evaluate with goal of regaining prior level of  by assistance  Stand to sit transfer: stand by assistance  Toilet transfer: stand by assistance  Toilet transfer equipment: standard toilet, grab bars, cane  Comments:  Functional Mobility  Functional Mobility Activity: to/from bathroom, 100ft  Device Use: single point cane  Required Assistance: contact guard assistance  Balance:  Static Sitting Balance: good: independent with functional balance in unsupported position  Dynamic Sitting Balance: fair (+): maintains balance at SBA/supervision without use of UE support  Static Standing Balance: fair (-): maintains balance at SBA with use of UE support  Dynamic Standing Balance: fair (-): maintains balance at CGA with use of UE support  Comments:    Other Therapeutic Interventions    Functional Outcomes  AM-PAC Inpatient Daily Activity Raw Score: 19                                    Cognition  WFL  Orientation:    alert and oriented x 4  Command Following:   WFL     Education  Barriers To Learning: visual  Patient Education: patient educated on goals, OT role and benefits, plan of care, precautions, ADL adaptive strategies, energy conservation, transfer training, discharge recommendations  Learning Assessment:  patient verbalizes understanding, would benefit from continued reinforcement    Assessment  Activity Tolerance: pt tolerated all activities during therapy session, including ADL skills and ambulation.  Impairments Requiring Therapeutic Intervention: decreased functional mobility, decreased ADL status, decreased endurance, decreased balance  Prognosis: good  Clinical Assessment: pt is an 81 y/o female who presents with dysarthria. MRI brain pending. Pt presents with slightly below her PLOF and requires up to CGA for ambulation and SBA for ADL skills. Pt lives alone with her dog \"Diamond\". Pt ambulated on this date w/ CGA using a SPC. She will continue to benefit from OT services during hospital stay to improve her functional indep and decrease her burden of care.

## 2025-03-30 NOTE — PLAN OF CARE
Problem: Chronic Conditions and Co-morbidities  Goal: Patient's chronic conditions and co-morbidity symptoms are monitored and maintained or improved  Outcome: Progressing  Flowsheets (Taken 3/30/2025 1111)  Care Plan - Patient's Chronic Conditions and Co-Morbidity Symptoms are Monitored and Maintained or Improved: Monitor and assess patient's chronic conditions and comorbid symptoms for stability, deterioration, or improvement     Problem: Discharge Planning  Goal: Discharge to home or other facility with appropriate resources  Outcome: Progressing  Flowsheets (Taken 3/30/2025 1111)  Discharge to home or other facility with appropriate resources: Identify barriers to discharge with patient and caregiver     Problem: Emotional Distress  Goal: Verbalization of thoughts and feelings  Outcome: Progressing

## 2025-03-30 NOTE — PROGRESS NOTES
[facility] - Inpatient Rehabilitation Department   Phone: [phone]    Physical Therapy    [x] Initial Evaluation            [] Daily Treatment Note         [] Discharge Summary      Patient: Jacqui Mcfadden   : 1945   MRN: [id number]   Date of Service:  3/30/2025  Admitting Diagnosis: Dysarthria due to acute cerebrovascular accident (CVA) (HCC)  Current Admission Summary: Pt is an 79 y/o female who presents to the hospital with dysarthria. CT head: \"Atrophy and chronic microvascular disease without acute intracranial abnormality.\" MRI brain pending.  Past Medical History:  has a past medical history of CAD (coronary artery disease), Histoplasmosis, Hyperlipidemia, Legally blind, and MI (myocardial infarction) ([facility]).  Past Surgical History:  has a past surgical history that includes Neck surgery; Appendectomy; Coronary angioplasty; Diagnostic Cardiac Cath Lab Procedure; and Cholecystectomy, laparoscopic (N/A, 2025).  Discharge Recommendations: Jacqui Mcfadden scored a 20/24 on the AM-PAC short mobility form. Current research shows that an AM-PAC score of 18 or greater is typically associated with a discharge to the patient's home setting. Based on the patient's AM-PAC score and their current functional mobility deficits, it is recommended that the patient have 2-3 sessions per week of Physical Therapy at d/c to increase the patient's independence.  At this time, this patient demonstrates the endurance and safety to discharge home with HHPT and a follow up treatment frequency of 2-3x/wk.  Please see assessment section for further patient specific details.    If patient discharges prior to next session this note will serve as a discharge summary.  Please see below for the latest assessment towards goals.     HOME HEALTH CARE: LEVEL 1 STANDARD    - Initial home health evaluation to occur within 24-48 hours, in patient home   - Therapy to evaluate with goal of regaining prior level of

## 2025-03-31 ENCOUNTER — APPOINTMENT (OUTPATIENT)
Dept: MRI IMAGING | Age: 80
End: 2025-03-31
Payer: MEDICARE

## 2025-03-31 ENCOUNTER — APPOINTMENT (OUTPATIENT)
Age: 80
End: 2025-03-31
Attending: INTERNAL MEDICINE
Payer: MEDICARE

## 2025-03-31 VITALS
HEIGHT: 62 IN | DIASTOLIC BLOOD PRESSURE: 67 MMHG | TEMPERATURE: 98.1 F | RESPIRATION RATE: 14 BRPM | WEIGHT: 139 LBS | BODY MASS INDEX: 25.58 KG/M2 | HEART RATE: 74 BPM | SYSTOLIC BLOOD PRESSURE: 117 MMHG | OXYGEN SATURATION: 98 %

## 2025-03-31 LAB
ECHO AO ASC DIAM: 3.5 CM
ECHO AO ASCENDING AORTA INDEX: 2.13 CM/M2
ECHO AO ROOT DIAM: 3.1 CM
ECHO AO ROOT INDEX: 1.89 CM/M2
ECHO AV AREA PEAK VELOCITY: 2 CM2
ECHO AV AREA VTI: 1.9 CM2
ECHO AV AREA/BSA PEAK VELOCITY: 1.2 CM2/M2
ECHO AV AREA/BSA VTI: 1.2 CM2/M2
ECHO AV MEAN GRADIENT: 5 MMHG
ECHO AV MEAN VELOCITY: 1 M/S
ECHO AV PEAK GRADIENT: 7 MMHG
ECHO AV PEAK VELOCITY: 1.3 M/S
ECHO AV VELOCITY RATIO: 0.77
ECHO AV VTI: 26.9 CM
ECHO BSA: 1.66 M2
ECHO LA AREA 2C: 14 CM2
ECHO LA AREA 4C: 13.2 CM2
ECHO LA DIAMETER INDEX: 1.65 CM/M2
ECHO LA DIAMETER: 2.7 CM
ECHO LA MAJOR AXIS: 5.7 CM
ECHO LA MINOR AXIS: 5.4 CM
ECHO LA TO AORTIC ROOT RATIO: 0.87
ECHO LA VOL BP: 27 ML (ref 22–52)
ECHO LA VOL MOD A2C: 29 ML (ref 22–52)
ECHO LA VOL MOD A4C: 24 ML (ref 22–52)
ECHO LA VOL/BSA BIPLANE: 16 ML/M2 (ref 16–34)
ECHO LA VOLUME INDEX MOD A2C: 18 ML/M2 (ref 16–34)
ECHO LA VOLUME INDEX MOD A4C: 15 ML/M2 (ref 16–34)
ECHO LV E' LATERAL VELOCITY: 11 CM/S
ECHO LV E' SEPTAL VELOCITY: 7.83 CM/S
ECHO LV EDV A2C: 72 ML
ECHO LV EDV A4C: 55 ML
ECHO LV EDV INDEX A4C: 34 ML/M2
ECHO LV EDV NDEX A2C: 44 ML/M2
ECHO LV EJECTION FRACTION 3D: 65 %
ECHO LV EJECTION FRACTION A2C: 53 %
ECHO LV EJECTION FRACTION A4C: 52 %
ECHO LV ESV A2C: 34 ML
ECHO LV ESV A4C: 27 ML
ECHO LV ESV INDEX A2C: 21 ML/M2
ECHO LV ESV INDEX A4C: 16 ML/M2
ECHO LV FRACTIONAL SHORTENING: 27 % (ref 28–44)
ECHO LV INTERNAL DIMENSION DIASTOLE INDEX: 2.01 CM/M2
ECHO LV INTERNAL DIMENSION DIASTOLIC: 3.3 CM (ref 3.9–5.3)
ECHO LV INTERNAL DIMENSION SYSTOLIC INDEX: 1.46 CM/M2
ECHO LV INTERNAL DIMENSION SYSTOLIC: 2.4 CM
ECHO LV IVSD: 1.1 CM (ref 0.6–0.9)
ECHO LV MASS 2D: 94.6 G (ref 67–162)
ECHO LV MASS INDEX 2D: 57.7 G/M2 (ref 43–95)
ECHO LV POSTERIOR WALL DIASTOLIC: 0.9 CM (ref 0.6–0.9)
ECHO LV RELATIVE WALL THICKNESS RATIO: 0.55
ECHO LVOT AREA: 2.5 CM2
ECHO LVOT AV VTI INDEX: 0.76
ECHO LVOT DIAM: 1.8 CM
ECHO LVOT MEAN GRADIENT: 2 MMHG
ECHO LVOT MEAN GRADIENT: 2 MMHG
ECHO LVOT PEAK GRADIENT: 4 MMHG
ECHO LVOT PEAK VELOCITY: 1 M/S
ECHO LVOT STROKE VOLUME INDEX: 31.8 ML/M2
ECHO LVOT SV: 52.1 ML
ECHO LVOT VTI: 20.5 CM
ECHO MV A VELOCITY: 1.03 M/S
ECHO MV E VELOCITY: 0.67 M/S
ECHO MV E/A RATIO: 0.65
ECHO MV E/E' LATERAL: 6.09
ECHO MV E/E' RATIO (AVERAGED): 7.32
ECHO MV E/E' SEPTAL: 8.56
ECHO PV MAX VELOCITY: 0.8 M/S
ECHO PV PEAK GRADIENT: 3 MMHG
ECHO RA AREA 4C: 10.2 CM2
ECHO RA END SYSTOLIC VOLUME APICAL 4 CHAMBER INDEX BSA: 11 ML/M2
ECHO RA VOLUME: 18 ML
ECHO RV BASAL DIMENSION: 3.1 CM
ECHO RV FREE WALL PEAK S': 17.1 CM/S
ECHO RV LONGITUDINAL DIMENSION: 7.8 CM
ECHO RV MID DIMENSION: 1.6 CM
ECHO RV TAPSE: 2.2 CM (ref 1.7–?)

## 2025-03-31 PROCEDURE — 99223 1ST HOSP IP/OBS HIGH 75: CPT | Performed by: PSYCHIATRY & NEUROLOGY

## 2025-03-31 PROCEDURE — G0378 HOSPITAL OBSERVATION PER HR: HCPCS

## 2025-03-31 PROCEDURE — 2500000003 HC RX 250 WO HCPCS: Performed by: INTERNAL MEDICINE

## 2025-03-31 PROCEDURE — 97530 THERAPEUTIC ACTIVITIES: CPT

## 2025-03-31 PROCEDURE — 6360000002 HC RX W HCPCS: Performed by: INTERNAL MEDICINE

## 2025-03-31 PROCEDURE — 97116 GAIT TRAINING THERAPY: CPT

## 2025-03-31 PROCEDURE — 97110 THERAPEUTIC EXERCISES: CPT

## 2025-03-31 PROCEDURE — 93306 TTE W/DOPPLER COMPLETE: CPT

## 2025-03-31 PROCEDURE — 6370000000 HC RX 637 (ALT 250 FOR IP): Performed by: INTERNAL MEDICINE

## 2025-03-31 PROCEDURE — 93306 TTE W/DOPPLER COMPLETE: CPT | Performed by: INTERNAL MEDICINE

## 2025-03-31 PROCEDURE — 70551 MRI BRAIN STEM W/O DYE: CPT

## 2025-03-31 RX ORDER — ASPIRIN 81 MG/1
81 TABLET, CHEWABLE ORAL DAILY
Qty: 30 TABLET | Refills: 3 | Status: SHIPPED | OUTPATIENT
Start: 2025-04-01

## 2025-03-31 RX ADMIN — SODIUM CHLORIDE, PRESERVATIVE FREE 10 ML: 5 INJECTION INTRAVENOUS at 10:22

## 2025-03-31 RX ADMIN — ENOXAPARIN SODIUM 40 MG: 100 INJECTION SUBCUTANEOUS at 10:22

## 2025-03-31 RX ADMIN — RANOLAZINE 500 MG: 500 TABLET, EXTENDED RELEASE ORAL at 10:22

## 2025-03-31 RX ADMIN — PANTOPRAZOLE SODIUM 40 MG: 40 TABLET, DELAYED RELEASE ORAL at 10:22

## 2025-03-31 RX ADMIN — ASPIRIN 81 MG: 81 TABLET, CHEWABLE ORAL at 10:22

## 2025-03-31 ASSESSMENT — PAIN SCALES - GENERAL
PAINLEVEL_OUTOF10: 0
PAINLEVEL_OUTOF10: 0

## 2025-03-31 NOTE — PLAN OF CARE
Problem: Chronic Conditions and Co-morbidities  Goal: Patient's chronic conditions and co-morbidity symptoms are monitored and maintained or improved  Outcome: Progressing     Problem: Discharge Planning  Goal: Discharge to home or other facility with appropriate resources  Outcome: Progressing     Problem: Emotional Distress  Goal: Verbalization of thoughts and feelings  Outcome: Progressing  Goal: Identifies/restores coping resources/skills  Outcome: Progressing     Problem: Safety - Adult  Goal: Free from fall injury  Outcome: Progressing     Problem: ABCDS Injury Assessment  Goal: Absence of physical injury  Outcome: Progressing     Problem: Pain  Goal: Verbalizes/displays adequate comfort level or baseline comfort level  Outcome: Progressing

## 2025-03-31 NOTE — CARE COORDINATION
Discharge Planning Note:    Chart reviewed and it appears that patient has minimal needs for discharge at this time. Risk Score 13 %     Primary Care Physician is Jose Raul Sherman DO    Primary insurance is Medicare A and B    Please notify case management if any discharge needs are identified.      Case management will continue to follow progress and update discharge plan as needed.    Update: MICHAEL called Jacqueline with [facility] and she will accept the pt. MICHAEL perfect served the provider for [facility] orders.  FACILITY:     [facility]  ADDRESS:   [address] [place] [address]   PHONE:        [phone]  FAX:              [phone]      Electronically signed by Wilton Deleon on 3/31/2025 at 8:23 AM  Electronically signed by Wilton Deleon on 3/31/2025 at 2:55 PM

## 2025-03-31 NOTE — CONSULTS
In patient Neurology consult    [facility] Neurology  MD Jacqui Jacome  1945    Date of Service: 3/31/2025    Referring Physician: Ximena Barrios MD      Reason for the consult and CC: Acute aphasia    HPI:   The patient is a 80 y.o.  years old female with past medical history of hypertension, hyperlipidemia, and other medical problem comes to the hospital with acute speech disturbance.  Degree severe duration persistent.  Description slurred speech and having difficulty forming words.  Patient reports symptoms started tonight before coming to the emergency room and upon wakening up this morning she continued to have some thus came to the emergency room.  No noted headaches, vision changes, dizziness, extremity weakness or paresthesias.  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  No prior history of such episodes.  In emergency room CT head showed no acute intracranial abnormality.  CTA showed no LVO and right vertebral artery stenosis.  Stroke team was consulted, TNK was not recommended.  Lab workup mostly unremarkable.  She was given aspirin and admitted to the hospital further evaluation.  Neurology was consulted MRI brain was ordered.  Today the patient reports no recurrence of symptoms.  She feels back to her baseline.  She denies headache, dizziness, dysarthria, dysphagia extremity weakness or paresthesias.  Other review of systems unremarkable       Constitutional:   Vitals:    03/30/25 2000 03/31/25 0000 03/31/25 0400 03/31/25 0600   BP: 131/89 (!) 104/51 120/79    Pulse: 94 77 71    Resp: 16 13 12    Temp: 98.2 °F (36.8 °C) 97 °F (36.1 °C) 96.9 °F (36.1 °C)    TempSrc: Oral Temporal Temporal    SpO2:  94% 96%    Weight:    63 kg (139 lb)   Height:             I personally reviewed and updated social history, past medical history, medications, allergy, surgical history, and family history as documented in the patient's electronic health

## 2025-03-31 NOTE — CARE COORDINATION
Case Management -  Discharge Note      Patient Name: Jacqui Mcfadden                   YOB: 1945  Room: [address] [place] [address]-            Readmission Risk (Low < 19, Mod (19-27), High > 27): Readmission Risk Score: 13.4    Current PCP: Jose Raul Sherman DO      (IMM) Important Message from Medicare:    Has pt received appropriate compliance notices before being discharged if required: yes  Compliance doc:  [x] 2nd IMM; [] Code 44 [] Mcqueen  Date Given: 3/31/25 Given By: zak    PT AM-PAC: 20 /24  OT AM-PAC: 19 /24    Patient/patient representative has been educated on the benefits of HHC as well as the possible risks of declining recommended services. Patient/patient representative has acknowledged the information provided and decided on the following discharge plan. Patient/ patient representative has been provided freedom of choice regarding service provider, supported by basic dialogue that supports the patient's individualized plan of care/goals.    Home Care Information:   Is patient resuming current home health care services: No    Home Care Agency:   FACILITY:     [facility]  ADDRESS:   [address] [place] [address]   PHONE:        [phone]  FAX:              [phone]              Services: PT/OT/nursing  Home Health Order Obtained: MICHAEL sent message to provider    Home health agency notified of discharge.       [facility] agency notified of discharge:  [x] Yes [] No  [] NA    Family notified of discharge:  [] Yes  [x] No, pt will notify  [] NA    Facility notified of discharge:  [] Yes  [] No  [x] NA    Pt is being discharged with Outpt IV Antibiotics  [] Yes [] No  [x] NA  If yes, make sure MARGARET is faxed to HHC agency, and meds are called in to pharmacy by RN from MARGARET orders only.      Financial    Payor: MEDICARE / Plan: MEDICARE PART A AND B / Product Type: *No Product type* /     Pharmacy:  Potential assistance Purchasing Medications:

## 2025-03-31 NOTE — PROGRESS NOTES
CLINICAL PHARMACY NOTE: MEDS TO BEDS    Total # of Prescriptions Filled: 1   The following medications were delivered to the patient:  Aspirin 81 mg chew     Additional Documentation: Pt picked up in outpatient pharmacy   Demetria Anders CPhT

## 2025-03-31 NOTE — CARE COORDINATION
03/31/25 0843   IMM Letter   IMM Letter given to Patient/Family/Significant other/Guardian/POA/by: IMM Given   IMM Letter date given:  --    IMM Letter time given:  --    Observation Status Letter date given: 03/31/25   Observation Status Letter time given: 0843   Observation Status Letter given to Patient/Family/Significant other/Guardian/POA/by: OBS Notice Given

## 2025-03-31 NOTE — DISCHARGE INSTR - COC
Continuity of Care Form    Patient Name: Jacqui Mcfadden   :  1945  MRN:  [id number]    Admit date:  3/29/2025  Discharge date:  3/31/2025    Code Status Order: Full Code   Advance Directives:     Admitting Physician:  Ximena Barrios MD  PCP: Jose Raul Sherman DO    [id number]  Discharging Unit Phone Number: [phone]    Emergency Contact:   Extended Emergency Contact Information  Primary Emergency Contact: Shanon Boyd  Home Phone: [phone]  Work Phone: [phone]  Mobile Phone: [phone]  Relation: Child  Preferred language: English   needed? No  Secondary Emergency Contact: Arlene Berg  Mobile Phone: [phone]  Relation: Child    Past Surgical History:  Past Surgical History:   Procedure Laterality Date    APPENDECTOMY      CHOLECYSTECTOMY, LAPAROSCOPIC N/A 2025    LAPAROSCOPIC CHOLECYSTECTOMY performed by Robert Cruz MD at [facility] OR    CORONARY ANGIOPLASTY      DIAGNOSTIC CARDIAC CATH LAB PROCEDURE      1/2024 x 5 stents    NECK SURGERY             Immunization History:   Immunization History   Administered Date(s) Administered    COVID-19, MODERNA Bivalent, (age 12y+), IM, 50 mcg/0.5 mL 2022       Active Problems:  Patient Active Problem List   Diagnosis Code    NSTEMI (non-ST elevated myocardial infarction) ([facility]) I21.4    CAD (coronary artery disease) I25.10    Gallstones K80.20    Acute cholecystitis K81.0    Cholecystitis K81.9    Chronic cholecystitis with calculus K80.10    Dysarthria due to acute cerebrovascular accident (CVA) (Former[facility]) I63.9, R47.1       Isolation/Infection:   Isolation            No Isolation          Patient Infection Status    None to display         Nurse Assessment:  Last Vital Signs: /67   Pulse 74   Temp 98.1 °F (36.7 °C) (Oral)   Resp 14   Ht 1.575 m (5' 2\")   Wt 63 kg (139 lb)   SpO2 98%   BMI 25.42 kg/m²     Last documented pain score

## 2025-03-31 NOTE — PLAN OF CARE
Problem: Chronic Conditions and Co-morbidities  Goal: Patient's chronic conditions and co-morbidity symptoms are monitored and maintained or improved  3/31/2025 1446 by Nellie Hammonds RN  Outcome: Adequate for Discharge  3/31/2025 1446 by Nellie Hammonds RN  Outcome: Adequate for Discharge  3/31/2025 1126 by Nellie Hammonds, RN  Outcome: Progressing     Problem: Discharge Planning  Goal: Discharge to home or other facility with appropriate resources  3/31/2025 1446 by Nellie Hammonds, RN  Outcome: Adequate for Discharge  3/31/2025 1446 by Nellie Hammonds RN  Outcome: Adequate for Discharge  3/31/2025 1126 by Nellie Hammonds RN  Outcome: Progressing     Problem: Emotional Distress  Goal: Verbalization of thoughts and feelings  3/31/2025 1446 by Nellie Hammonds, RN  Outcome: Adequate for Discharge  3/31/2025 1446 by Nellie Hammonds, RN  Outcome: Adequate for Discharge  3/31/2025 1126 by Nellie Hammonds RN  Outcome: Progressing  Goal: Identifies/restores coping resources/skills  3/31/2025 1446 by Nellie Hammonds, RN  Outcome: Adequate for Discharge  3/31/2025 1446 by Nellie Hammonds, RN  Outcome: Adequate for Discharge  3/31/2025 1126 by Nellie Hammonds RN  Outcome: Progressing     Problem: Safety - Adult  Goal: Free from fall injury  3/31/2025 1446 by Nellie Hammonds, RN  Outcome: Adequate for Discharge  3/31/2025 1446 by Nellie Hammonds, RN  Outcome: Adequate for Discharge  3/31/2025 1126 by Nellie Hammonds, RN  Outcome: Progressing     Problem: ABCDS Injury Assessment  Goal: Absence of physical injury  3/31/2025 1446 by Nellie Hammonds, RN  Outcome: Adequate for Discharge  3/31/2025 1446 by Nellie Hammonds, RN  Outcome: Adequate for Discharge  3/31/2025 1126 by Nellie Hammonds, RN  Outcome: Progressing     Problem: Pain  Goal: Verbalizes/displays adequate comfort level or baseline comfort level  3/31/2025 0016 by

## 2025-03-31 NOTE — PROGRESS NOTES
Hospitalist Progress Note      PCP: Jose Raul Sherman DO    Date of Admission: 3/29/2025    LOS: 1    Chief Complaint:   Chief Complaint   Patient presents with    Aphasia     Pt. Reports difficulty speaking, states, \"It feels like my jaw jerks down.\" Pt. Denies symptoms at this time. Reports symptoms have been intermittent since yesterday morning. Symptoms greater than 24 hours.        Case Summary:   80 y.o. 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      Active Hospital Problems    Diagnosis Date Noted    Dysarthria due to acute cerebrovascular accident (CVA) ([facility]) [I63.9, R47.1] 03/29/2025    CAD (coronary artery disease) [I25.10] 12/30/2023    NSTEMI (non-ST elevated myocardial infarction) ([facility]) [I21.4] 12/29/2023         Principal Problem:    Acute aphasia with dysarthria: Patient with intermittent dysarthria.  Stuttering that is uncontrollable.    CT brain with no acute pathology.    CT angiogram of the head and neck unremarkable.  - Follow-up echocardiogram and MRI today  - Continue aspirin, statin  - Neurology following with recommendations  - PT OT and speech pathology consult    Active Problems:    CAD (coronary artery disease): No chest pains or shortness of breath.  Continue ranolazine, aspirin and statin   GERD: On pantoprazole      Medications:  Reviewed  Infusion Medications    sodium chloride       Scheduled Medications    enoxaparin  40 mg SubCUTAneous Daily    pantoprazole  40 mg Oral QAM AC    ranolazine  500 mg Oral BID    sodium chloride flush  5-40 mL IntraVENous 2 times per day    atorvastatin  80 mg Oral Nightly    aspirin  81 mg Oral Daily    Or    aspirin  300 mg Rectal Daily     PRN Meds: sodium chloride flush, sodium chloride, ondansetron **OR** ondansetron, polyethylene glycol      DVT Prophylaxis: Subcut enoxaparin  Diet: ADULT DIET; Regular  Code    Final Result   No acute cardiopulmonary process.         CTA HEAD NECK W CONTRAST   Final Result   1. Atherosclerosis contributes to moderate stenosis involving the V4 segment   of the right vertebral artery.   2. Otherwise, no significant stenosis or large vessel occlusion of the   [id number].   3. No flow limiting stenosis of the cervical carotid/vertebral arteries.         CT HEAD WO CONTRAST   Final Result   Addendum (preliminary) 1 of 1   ADDENDUM:   Findings were discussed with ANA DUNHAM at 2:02 pm on 3/29/2025.         Final   Atrophy and chronic microvascular disease without acute intracranial   abnormality.         MRI brain without contrast    (Results Pending)           Ximena Barrios MD      Please excuse brevity and/or typos. This report was transcribed using voice recognition software. Every effort was made to ensure accuracy, however, inadvertent computerized transcription errors may be present.

## 2025-03-31 NOTE — PROGRESS NOTES
sitting and standing.  Sensation:   reports numbness and tingling in (R) UE- tips of R middle and ring finger that has been going on for the past year  Proprioception:    WFL  Tone:   Normotonic  Coordination Testing:   WFL    ROM:   (B) LE AROM WFL  Strength:   (B) LE strength grossly WFL  Therapist Clinical Decision Making (Complexity): low complexity  Clinical Presentation: stable      Subjective  General: Pt semi-fowlers in bed on arrival, pleasant and agreeable to participate in PT treatment session. Reports confusion regarding plans for d/c today, asking for clarification regarding inpatient versus observation admissions.  Pain: 0/10  Pain Interventions: not applicable     Functional Mobility  Bed Mobility:  Supine to Sit: modified independent  Sit to Supine: modified independent  Comments: HOB elevated.   Transfers:  Sit to stand transfer: stand by assistance  Stand to sit transfer: stand by assistance  Comments: Completes transfer from EOB.   Ambulation:  Surface:level surface  Assistive Device: single point cane  Assistance: contact guard assistance  Distance: 250 ft  Gait Mechanics: Decreased gait speed, shortened step length, narrow KASIA, decreased heel strike more notable on RLE. Demos good stability using SPC, no LOB.   Comments: Patient frequently reaches for handrails when ambulating in hallways.   Stair Mobility:  Stair mobility not completed on this date.  Comments:  Wheelchair Mobility:  No w/c mobility completed on this date.  Comments:  Balance:  Static Sitting Balance: good: independent with functional balance in unsupported position  Dynamic Sitting Balance: good: independent with functional balance in unsupported position  Static Standing Balance: fair (-): maintains balance at SBA with use of UE support  Dynamic Standing Balance: fair (-): maintains balance at CGA with use of UE support  Comments:    Other Therapeutic Interventions  PT treatment split into 2 sessions due to technician arrival  to administer echo. Functional mobility completed during first session. Reviewed the following exercises with patient during second session. Discussed each exercise with patient attempting a few repetitions to ensure full understanding and proper execution.   - Supine Bridge  - 1 x daily - 7 x weekly - 3 sets - 10 reps  - Small Range Straight Leg Raise  - 1 x daily - 7 x weekly - 3 sets - 10 reps  - Sit to Stand Without Arm Support  - 1 x daily - 7 x weekly - 3 sets - 10 reps  - Seated Long Arc Quad  - 1 x daily - 7 x weekly - 3 sets - 10 reps  - Clam with Resistance  - 1 x daily - 7 x weekly - 3 sets - 10 reps  - Heel Raises with Counter Support  - 1 x daily - 7 x weekly - 3 sets - 10 reps  - Standing Knee Flexion with Counter Support  - 1 x daily - 7 x weekly - 3 sets - 10 reps  - Standing Hip Extension with Counter Support  - 1 x daily - 7 x weekly - 3 sets - 10 reps  - Standing March with Counter Support  - 1 x daily - 7 x weekly - 3 sets - 10 reps  - Side Stepping with Counter Support  - 1 x daily - 7 x weekly - 3 sets - 10 reps    Functional Outcomes  AM-PAC Inpatient Mobility Raw Score : 20              Cognition  WFL  Orientation:    alert and oriented x 4  Command Following:   WFL    Education  Barriers To Learning: visual  Patient Education: patient educated on goals, PT role and benefits, plan of care, general safety, functional mobility training, disease specific education, transfer training, discharge recommendations  Learning Assessment:  patient verbalizes understanding, would benefit from continued reinforcement    Assessment  Activity Tolerance: Pt tolerated the PT initial evaluation well with no significant limitations.  Impairments Requiring Therapeutic Intervention: decreased functional mobility, decreased strength, decreased endurance, decreased balance  Prognosis: good  Clinical Assessment: Pt is an 81 y/o female who presents to the hospital with dysarthria. MRI brain pending. Pt is presenting

## 2025-04-01 ENCOUNTER — CARE COORDINATION (OUTPATIENT)
Dept: CASE MANAGEMENT | Age: 80
End: 2025-04-01

## 2025-04-01 ENCOUNTER — TELEPHONE (OUTPATIENT)
Dept: FAMILY MEDICINE CLINIC | Age: 80
End: 2025-04-01

## 2025-04-01 DIAGNOSIS — R47.1 DYSARTHRIA DUE TO ACUTE CEREBROVASCULAR ACCIDENT (CVA) (HCC): Primary | ICD-10-CM

## 2025-04-01 DIAGNOSIS — I63.9 DYSARTHRIA DUE TO ACUTE CEREBROVASCULAR ACCIDENT (CVA) (HCC): Primary | ICD-10-CM

## 2025-04-01 NOTE — CARE COORDINATION
Care Transitions Note    Initial Call - Call within 2 business days of discharge: Yes    Patient Current Location:  Home: [address] [place] [id number]    Care Transition Nurse contacted the patient by telephone to perform post hospital discharge assessment, verified name and  as identifiers.  Provided introduction to self, and explanation of the Care Transition Nurse role.    Patient: Jacqui Mcfadden    Patient : 1945   MRN: [id number]    Reason for Admission:   Discharge Date: 3/31/25  RURS: Readmission Risk Score: 13.1      Last Discharge Facility       Date Complaint Diagnosis Description Type Department Provider    3/29/25 Aphasia Dysarthria ED to Hosp-Admission (Discharged) (ADMITT[facility]) [facility] Ximena Barrios MD; Ernst ...            Was this an external facility discharge? No    Additional needs identified to be addressed with provider   No needs identified             Method of communication with provider: none.    Patients top risk factors for readmission: medical condition-dysarthria    Interventions to address risk factors:   Review of patient management of conditions/medications: as above    Care Summary Note: Pt states doing well regarding her speech but is struggling with constipation. Has taken a stool softener. If she doesn't go today, will reach out to PCP. HC will call back with a time they will be coming out. Pt states she will make her own f/u appt, politely declined this nurse's assistance.Will send message to PCP office to schedule a hospital f/u visit.   Agreed to more CTC f/u calls.      Care Transition Nurse reviewed discharge instructions with patient. The patient was given an opportunity to ask questions; all questions answered at this time.. The patient verbalized understanding.   Were discharge instructions available to patient? Yes.   Reviewed appropriate site of care based on symptoms and resources available to patient including: PCP  Home health  When to

## 2025-04-01 NOTE — TELEPHONE ENCOUNTER
Care Transitions Initial Follow Up Call    Outreach made within 2 business days of discharge: Yes    Patient: Jacqui Mcfadden Patient : 1945   MRN: [id number]  Reason for Admission: CVA  Discharge Date: 3/31/25       Spoke with: Jacqui    Discharge department/facility: [facility] Interactive Patient Contact:  Was patient able to fill all prescriptions: Yes  Was patient instructed to bring all medications to the follow-up visit: Yes  Is patient taking all medications as directed in the discharge summary? Yes  Does patient understand their discharge instructions: Yes  Does patient have questions or concerns that need addressed prior to 7-14 day follow up office visit: no    Additional needs identified to be addressed with provider  No needs identified             Scheduled appointment with PCP within 7-14 days    Follow Up  No future appointments.    Tania Saint Vil, MA

## 2025-04-02 ENCOUNTER — CARE COORDINATION (OUTPATIENT)
Dept: CASE MANAGEMENT | Age: 80
End: 2025-04-02

## 2025-04-02 ENCOUNTER — TELEPHONE (OUTPATIENT)
Dept: FAMILY MEDICINE CLINIC | Age: 80
End: 2025-04-02

## 2025-04-02 NOTE — TELEPHONE ENCOUNTER
Osbaldo from [facility] [phone] would lucia a verbal for Physical Therapy.please advise

## 2025-04-02 NOTE — CARE COORDINATION
Care Transitions Note    Follow Up Call     Patient Current Location:  Home: [address] [address]    Care Transition Nurse contacted the patient by telephone. Verified name and  as identifiers.    Additional needs identified to be addressed with provider   No needs identified                 Method of communication with provider: none.    Care Summary Note: Pt states doing well, no issues or concerns. Has had several BMs.HC coming out today. Agreed to more CTC f/u calls.    Advance Care Planning:   Does patient have an Advance Directive: reviewed during previous call, see note. .    Medication Review:  No changes since last call.     Remote Patient Monitoring:  Offered patient enrollment in the Remote Patient Monitoring (RPM) program for in-home monitoring: Patient is not eligible for RPM program because: insurance coverage.    Assessments:  Care Transitions Subsequent and Final Call    Subsequent and Final Calls  Do you have any ongoing symptoms?: No  Have your medications changed?: No  Do you have any questions related to your medications?: No  Do you currently have any active services?: Yes  Are you currently active with any services?: Home Health  Do you have any needs or concerns that I can assist you with?: No  Identified Barriers: None  Care Transitions Interventions  No Identified Needs  Other Interventions:              Follow Up Appointment:   Reviewed upcoming appointment(s).  Future Appointments         Provider Specialty Dept Phone    2025 11:30 AM Curtis Hernandez MD Family Medicine [phone]            Care Transition Nurse provided contact information.  Plan for follow-up call in 6-10 days based on severity of symptoms and risk factors.  Plan for next call: self management-   follow-up appointment-       Larry Shahid RN

## 2025-04-03 NOTE — DISCHARGE SUMMARY
Hospital Medicine Discharge Summary    Patient ID: Jacqui Mcfadden      Patient's PCP: Jose Raul Sherman DO    Admit Date: 3/29/2025     Discharge Date: 3/31/2025      Admitting Provider: Ximena Barrios MD     Discharge Provider: Ximena Barrios MD     Discharge Diagnoses:       Active Hospital Problems    Diagnosis     Dysarthria due to acute cerebrovascular accident (CVA) ([facility]) [I63.9, R47.1]     CAD (coronary artery disease) [I25.10]     NSTEMI (non-ST elevated myocardial infarction) ([facility]) [I21.4]        The patient was seen and examined on day of discharge and this discharge summary is in conjunction with any daily progress note from day of discharge.    Hospital Course:   The patient is a 80 y.o. 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       Acute aphasia with dysarthria: Patient with intermittent dysarthria.  Stuttering that is uncontrollable.    CT brain with no acute pathology.    CT angiogram of the head and neck unremarkable.  MRI brain with no acute pathology. ECHO- WN  Patient seen by Neurology and will discharge with 30day event monitor  Outpt followup with neurology in 4 weeks  Continue ASA and statin     Active Problems:    CAD (coronary artery disease): No chest pains or shortness of breath.  Continue ranolazine, aspirin and statin   GERD: On pantoprazole          Physical Exam Performed:     /67   Pulse 74   Temp 98.1 °F (36.7 °C) (Oral)   Resp 14   Ht 1.575 m (5' 2\")   Wt 63 kg (139 lb)   SpO2 98%   BMI 25.42 kg/m²     General appearance:  No apparent distress, appears stated age and cooperative.  HEENT:  Normal cephalic, atraumatic without obvious deformity. Pupils equal, round, and reactive to light.  Extra ocular muscles intact. Conjunctivae/corneas clear.  Neck: Supple, with full range of motion. No jugular venous  distention. Trachea midline.  Respiratory:  Normal respiratory effort. Clear to auscultation, bilaterally without Rales/Wheezes/Rhonchi.  Cardiovascular:  Regular rate and rhythm with normal S1/S2 without murmurs, rubs or gallops.  Abdomen: Soft, non-tender, non-distended with normal bowel sounds.  Musculoskeletal:  No clubbing, cyanosis or edema bilaterally.  Full range of motion without deformity.  Skin: Skin color, texture, turgor normal.  No rashes or lesions.  Neurologic:  Neurovascularly intact without any focal sensory/motor deficits. Cranial nerves: II-XII intact, grossly non-focal.  Psychiatric:  Alert and oriented, thought content appropriate, normal insight  Capillary Refill: Brisk,< 3 seconds   Peripheral Pulses: +2 palpable, equal bilaterally       Labs: For convenience and continuity at follow-up the following most recent labs are provided:      CBC:    Lab Results   Component Value Date/Time    WBC 6.8 03/30/2025 05:18 AM    HGB 14.5 03/30/2025 05:18 AM    HCT 43.9 03/30/2025 05:18 AM     03/30/2025 05:18 AM       Renal:    Lab Results   Component Value Date/Time     03/30/2025 05:18 AM    K 4.3 03/30/2025 05:18 AM     03/30/2025 05:18 AM    CO2 23 03/30/2025 05:18 AM    BUN 10 03/30/2025 05:18 AM    CREATININE 0.9 03/30/2025 05:18 AM    CALCIUM 9.5 03/30/2025 05:18 AM    PHOS 3.1 12/08/2024 05:12 AM         Significant Diagnostic Studies    Radiology:   MRI brain without contrast   Final Result   1. No acute infarct or acute intracranial process identified.   2. Mild diffuse cerebral volume loss and chronic small vessel ischemic   changes.         XR CHEST PORTABLE   Final Result   No acute cardiopulmonary process.         CTA HEAD NECK W CONTRAST   Final Result   1. Atherosclerosis contributes to moderate stenosis involving the V4 segment   of the right vertebral artery.   2. Otherwise, no significant stenosis or large vessel occlusion of the   [id number]-ry-[id number].   3. No flow

## 2025-04-04 ENCOUNTER — OFFICE VISIT (OUTPATIENT)
Dept: FAMILY MEDICINE CLINIC | Age: 80
End: 2025-04-04

## 2025-04-04 VITALS
WEIGHT: 142.4 LBS | DIASTOLIC BLOOD PRESSURE: 68 MMHG | OXYGEN SATURATION: 96 % | HEIGHT: 62 IN | SYSTOLIC BLOOD PRESSURE: 108 MMHG | HEART RATE: 69 BPM | BODY MASS INDEX: 26.2 KG/M2

## 2025-04-04 DIAGNOSIS — R39.9 UTI SYMPTOMS: ICD-10-CM

## 2025-04-04 DIAGNOSIS — Z09 HOSPITAL DISCHARGE FOLLOW-UP: Primary | ICD-10-CM

## 2025-04-04 LAB
BILIRUBIN, POC: NORMAL
BLOOD URINE, POC: NEGATIVE
CLARITY, POC: NORMAL
COLOR, POC: NORMAL
GLUCOSE URINE, POC: NEGATIVE MG/DL
KETONES, POC: NEGATIVE MG/DL
LEUKOCYTE EST, POC: NEGATIVE
NITRITE, POC: NEGATIVE
PH, POC: 5
PROTEIN, POC: 100 MG/DL
SPECIFIC GRAVITY, POC: 1.03
UROBILINOGEN, POC: 0.2 MG/DL

## 2025-04-04 NOTE — PROGRESS NOTES
Post-Discharge Transitional Care Follow Up      Jacqui Mcfadden   YOB: 1945    Date of Office Visit:  4/4/2025  Date of Hospital Admission: 3/29/25  Date of Hospital Discharge: 3/31/25  Readmission Risk Score (high >=14%. Medium >=10%):Readmission Risk Score: 13.1      Care management risk score Rising risk (score 2-5) and Complex Care (Scores >=6): No Risk Score On File     Non face to face  following discharge, date last encounter closed (first attempt may have been earlier): 04/01/2025     Call initiated 2 business days of discharge: Yes     Hospital discharge follow-up  -     ND DISCHARGE MEDS RECONCILED W/ CURRENT OUTPATIENT MED LIST    Medical Decision Making: high complexity  No follow-ups on file.           Subjective:   HPI presented to the hospital with dysarthria and aphasia and stuttering speech    Inpatient course: Discharge summary reviewed- see chart.  Workup including head CT, MRI of the head, CT angiogram of the neck and head, echocardiogram were all normal.  Symptoms resolved.  Was started on aspirin and statin  Interval history/Current status: Has felt fine with no further symptoms, still planning to have her total knee replacement done 4/15/2025    Patient Active Problem List   Diagnosis    NSTEMI (non-ST elevated myocardial infarction) (HCC)    CAD (coronary artery disease)    Gallstones    Acute cholecystitis    Cholecystitis    Chronic cholecystitis with calculus    Dysarthria due to acute cerebrovascular accident (CVA) (H[facility])       Medications listed as ordered at the time of discharge from hospital     Medication List            Accurate as of April 4, 2025 11:55 AM. If you have any questions, ask your nurse or doctor.                CONTINUE taking these medications      aspirin 81 MG chewable tablet  Take 1 tablet by mouth daily     atorvastatin 80 MG tablet  Commonly known as: LIPITOR  Take 1 tablet by mouth nightly     omeprazole 20 MG delayed release capsule  Commonly

## 2025-04-08 ENCOUNTER — TELEPHONE (OUTPATIENT)
Dept: CARDIOLOGY CLINIC | Age: 80
End: 2025-04-08

## 2025-04-08 NOTE — TELEPHONE ENCOUNTER
CARDIAC CLEARANCE     What type of procedure are you having?  Total Left Knee Replacement     Which physician is performing your procedure?   Dr. Kennedy Ireland     When is your procedure scheduled for?   04/15    Where are you having this procedure?   [facility]     Are you taking Blood Thinners?  Yes    If so what? (Name/dose/frequesncy)  Aspirin 81mg     Does the surgeon want you to stop your blood thinner?  If so for how long?  Yes -  Needs DCE opinion    Phone Number and Contact Name for Physicians office:  [phone]      Fax number to send information:  [phone]

## 2025-04-09 ENCOUNTER — CARE COORDINATION (OUTPATIENT)
Dept: CASE MANAGEMENT | Age: 80
End: 2025-04-09

## 2025-04-09 NOTE — CARE COORDINATION
Care Transitions Note    Follow Up Call     Patient Current Location:  Home: [address] [facility] [address]    Care Transition Nurse contacted the patient by telephone. Verified name and  as identifiers.    Additional needs identified to be addressed with provider   No needs identified                 Method of communication with provider: none.    Care Summary Note: Pt states doing well, no issues or concerns. Having surgery on her hip next Tuesday, 4/15/25. Agreed to more CTC f/u calls.    Advance Care Planning:   Does patient have an Advance Directive: reviewed during previous call, see note. .    Medication Review:  No changes since last call.     Remote Patient Monitoring:  Offered patient enrollment in the Remote Patient Monitoring (RPM) program for in-home monitoring: Patient is not eligible for RPM program because: insurance coverage.    Assessments:  Care Transitions Subsequent and Final Call    Subsequent and Final Calls  Do you have any ongoing symptoms?: No  Have your medications changed?: No  Do you have any questions related to your medications?: No  Do you currently have any active services?: Yes  Are you currently active with any services?: Home Health  Do you have any needs or concerns that I can assist you with?: No  Identified Barriers: None  Care Transitions Interventions  No Identified Needs  Other Interventions:              Follow Up Appointment:         Care Transition Nurse provided contact information.  Plan for follow-up call in 6-10 days based on severity of symptoms and risk factors.  Plan for next call: self management-post surgery      Larry Shahid RN

## 2025-04-17 ENCOUNTER — CARE COORDINATION (OUTPATIENT)
Dept: CASE MANAGEMENT | Age: 80
End: 2025-04-17

## 2025-04-17 NOTE — CARE COORDINATION
Care Transitions Note    Follow Up Call     Attempted to reach patient for transitions of care follow up.  Unable to reach patient.      Outreach Attempts:   HIPAA compliant voicemail left for patient.     Care Summary Note:     Follow Up Appointment:       Plan for follow-up call in 2-5 days based on severity of symptoms and risk factors. Plan for next call: self management-     Larry Shahid RN        
5

## 2025-04-21 ENCOUNTER — CARE COORDINATION (OUTPATIENT)
Dept: CASE MANAGEMENT | Age: 80
End: 2025-04-21

## 2025-04-21 NOTE — CARE COORDINATION
Care Transitions Note    Final Call     Attempted to reach patient for transitions of care follow up.  Unable to reach patient.      Outreach Attempts:   Unable to leave message.     Patient closed (unable to reach patient) from the Care Transitions program on 4/21/25.  Patient/family has the ability to self-manage at this time..      Handoff:   Patient was not referred to the ACM team due to unable to contact patient.      Care Summary Note:     Assessments:  Care Transitions Subsequent and Final Call    Subsequent and Final Calls  Care Transitions Interventions  Other Interventions:              Upcoming Appointments:        Larry Shahid RN

## 2025-06-17 RX ORDER — OMEPRAZOLE 20 MG/1
CAPSULE, DELAYED RELEASE ORAL
Qty: 60 CAPSULE | Refills: 0 | Status: SHIPPED | OUTPATIENT
Start: 2025-06-17

## 2025-06-17 NOTE — TELEPHONE ENCOUNTER
Medication:   Requested Prescriptions     Pending Prescriptions Disp Refills    omeprazole (PRILOSEC) 20 MG delayed release capsule [Pharmacy Med Name: Omeprazole Oral Capsule Delayed Release 20 MG] 60 capsule 0     Sig: TAKE 1 CAPSULE BY MOUTH IN THE MORNING BEFORE BREAKFAST        Last Filled:  02/19/25 60 caps 0 refill     Patient Phone Number: [phone] (home)     Last appt: 4/4/2025   Next appt: Visit date not found    Last OARRS:        No data to display

## 2025-07-10 ENCOUNTER — TELEPHONE (OUTPATIENT)
Dept: FAMILY MEDICINE CLINIC | Age: 80
End: 2025-07-10

## 2025-07-10 NOTE — TELEPHONE ENCOUNTER
----- Message from Dahlia STERN sent at 7/10/2025 11:13 AM EDT -----  Regarding: ECC Escalation To Practice  ECC Escalation To Practice      Type of Escalation: Red Flag Symptom  --------------------------------------------------------------------------------------------------------------------------    Information for Provider:  Patient is looking for appointment for: Symptom   Reasons for Message: Unable to reach practice     Additional Information : Patient experience rectal bleeding.  --------------------------------------------------------------------------------------------------------------------------    Relationship to Patient: Self  Call Back Info: OK to leave message on voicemail  Preferred Call Back Number:  [phone]

## 2025-07-11 NOTE — TELEPHONE ENCOUNTER
Did call pt yesterday Pt stated she was not having constant bleeding, stated she was constipated and was straining and might had cause her hemorrhoids to bleed. Pt stated she will monitor self and call back if she needs appt.

## 2025-07-15 ENCOUNTER — OFFICE VISIT (OUTPATIENT)
Dept: FAMILY MEDICINE CLINIC | Age: 80
End: 2025-07-15
Payer: MEDICARE

## 2025-07-15 VITALS
WEIGHT: 126.6 LBS | HEIGHT: 62 IN | OXYGEN SATURATION: 95 % | BODY MASS INDEX: 23.3 KG/M2 | DIASTOLIC BLOOD PRESSURE: 76 MMHG | SYSTOLIC BLOOD PRESSURE: 101 MMHG | TEMPERATURE: 97.2 F | HEART RATE: 93 BPM | RESPIRATION RATE: 16 BRPM

## 2025-07-15 DIAGNOSIS — K59.00 CONSTIPATION, UNSPECIFIED CONSTIPATION TYPE: Primary | ICD-10-CM

## 2025-07-15 PROCEDURE — 99213 OFFICE O/P EST LOW 20 MIN: CPT | Performed by: STUDENT IN AN ORGANIZED HEALTH CARE EDUCATION/TRAINING PROGRAM

## 2025-07-15 PROCEDURE — 1090F PRES/ABSN URINE INCON ASSESS: CPT | Performed by: STUDENT IN AN ORGANIZED HEALTH CARE EDUCATION/TRAINING PROGRAM

## 2025-07-15 PROCEDURE — G8399 PT W/DXA RESULTS DOCUMENT: HCPCS | Performed by: STUDENT IN AN ORGANIZED HEALTH CARE EDUCATION/TRAINING PROGRAM

## 2025-07-15 PROCEDURE — 1036F TOBACCO NON-USER: CPT | Performed by: STUDENT IN AN ORGANIZED HEALTH CARE EDUCATION/TRAINING PROGRAM

## 2025-07-15 PROCEDURE — G8427 DOCREV CUR MEDS BY ELIG CLIN: HCPCS | Performed by: STUDENT IN AN ORGANIZED HEALTH CARE EDUCATION/TRAINING PROGRAM

## 2025-07-15 PROCEDURE — G8420 CALC BMI NORM PARAMETERS: HCPCS | Performed by: STUDENT IN AN ORGANIZED HEALTH CARE EDUCATION/TRAINING PROGRAM

## 2025-07-15 PROCEDURE — 1123F ACP DISCUSS/DSCN MKR DOCD: CPT | Performed by: STUDENT IN AN ORGANIZED HEALTH CARE EDUCATION/TRAINING PROGRAM

## 2025-07-15 RX ORDER — DOCUSATE SODIUM 100 MG/1
100 CAPSULE, LIQUID FILLED ORAL 2 TIMES DAILY
Qty: 60 CAPSULE | Refills: 0 | Status: SHIPPED | OUTPATIENT
Start: 2025-07-15 | End: 2025-08-14

## 2025-07-15 RX ORDER — SENNOSIDES 8.6 MG/1
1 TABLET ORAL 2 TIMES DAILY
Qty: 60 TABLET | Refills: 0 | Status: SHIPPED | OUTPATIENT
Start: 2025-07-15 | End: 2025-08-14

## 2025-07-15 SDOH — ECONOMIC STABILITY: FOOD INSECURITY: WITHIN THE PAST 12 MONTHS, YOU WORRIED THAT YOUR FOOD WOULD RUN OUT BEFORE YOU GOT MONEY TO BUY MORE.: NEVER TRUE

## 2025-07-15 SDOH — ECONOMIC STABILITY: FOOD INSECURITY: WITHIN THE PAST 12 MONTHS, THE FOOD YOU BOUGHT JUST DIDN'T LAST AND YOU DIDN'T HAVE MONEY TO GET MORE.: NEVER TRUE

## 2025-07-15 ASSESSMENT — PATIENT HEALTH QUESTIONNAIRE - PHQ9
SUM OF ALL RESPONSES TO PHQ QUESTIONS 1-9: 0
1. LITTLE INTEREST OR PLEASURE IN DOING THINGS: NOT AT ALL
SUM OF ALL RESPONSES TO PHQ QUESTIONS 1-9: 0
SUM OF ALL RESPONSES TO PHQ QUESTIONS 1-9: 0
2. FEELING DOWN, DEPRESSED OR HOPELESS: NOT AT ALL
SUM OF ALL RESPONSES TO PHQ QUESTIONS 1-9: 0

## 2025-07-15 NOTE — PATIENT INSTRUCTIONS
At least 48 oz of water/tea per day    Add in high fiber food like prunes    Colace for 3 days alone then add on Senna

## 2025-07-16 NOTE — PROGRESS NOTES
Mood and Affect: Mood normal.         Behavior: Behavior normal.         Thought Content: Thought content normal.         Judgment: Judgment normal.         Body mass index is 23.16 kg/m².    Labs:  No results found for this or any previous visit (from the past 4 weeks).    Imaging:  No orders to display         ASSESSMENT & PLAN:    Jacqui Mcfadden is a 80 y.o. year old female here for Constipation (Pt had one bm yesterday but has to push so hard , has blood on paper . Currently takes dulcolax otc  every 2-3 days sometimes it works sometimes it doesn't )  .The following is a summary of the plan made at this visit:    1. Constipation, unspecified constipation type  -     docusate sodium (COLACE) 100 MG capsule; Take 1 capsule by mouth 2 times daily, Disp-60 capsule, R-0Normal  -     senna (SENOKOT) 8.6 MG tablet; Take 1 tablet by mouth 2 times daily, Disp-60 tablet, R-0Normal      Reviewed all pertinent previous records, including lab work and imaging.    Physical exam and vitals are reassuring.  Will provide conservative therapy for constipation and follow-up with patient if symptoms do not adequately improve.    Encouraged patient to call back with any question/concerns.  Return/ED precautions discussed, all questions/concerns answered and patient verbalized understanding/approval of treatment plan.   Jose Raul Sherman, DO    This note was generated completely or in part utilizing Dragon dictation speech recognition software.  Occasionally, words are mistranscribed and despite editing, the text may contain inaccuracies due to incorrect word recognition.  If further clarification is needed please contact the office at [phone].

## 2025-08-27 ENCOUNTER — OFFICE VISIT (OUTPATIENT)
Dept: FAMILY MEDICINE CLINIC | Age: 80
End: 2025-08-27
Payer: MEDICARE

## 2025-08-27 VITALS
DIASTOLIC BLOOD PRESSURE: 70 MMHG | SYSTOLIC BLOOD PRESSURE: 110 MMHG | OXYGEN SATURATION: 96 % | BODY MASS INDEX: 23.78 KG/M2 | WEIGHT: 130 LBS | HEART RATE: 69 BPM

## 2025-08-27 DIAGNOSIS — B88.0 CHIGGER BITE: Primary | ICD-10-CM

## 2025-08-27 DIAGNOSIS — I25.85 CHRONIC CORONARY MICROVASCULAR DYSFUNCTION: ICD-10-CM

## 2025-08-27 DIAGNOSIS — K59.01 SLOW TRANSIT CONSTIPATION: ICD-10-CM

## 2025-08-27 DIAGNOSIS — K21.9 GASTRIC REFLUX: ICD-10-CM

## 2025-08-27 DIAGNOSIS — Z87.891 HX OF NICOTINE DEPENDENCE: ICD-10-CM

## 2025-08-27 PROCEDURE — G2211 COMPLEX E/M VISIT ADD ON: HCPCS

## 2025-08-27 PROCEDURE — 1090F PRES/ABSN URINE INCON ASSESS: CPT

## 2025-08-27 PROCEDURE — G8399 PT W/DXA RESULTS DOCUMENT: HCPCS

## 2025-08-27 PROCEDURE — 1123F ACP DISCUSS/DSCN MKR DOCD: CPT

## 2025-08-27 PROCEDURE — 1036F TOBACCO NON-USER: CPT

## 2025-08-27 PROCEDURE — G8420 CALC BMI NORM PARAMETERS: HCPCS

## 2025-08-27 PROCEDURE — 1159F MED LIST DOCD IN RCRD: CPT

## 2025-08-27 PROCEDURE — G8427 DOCREV CUR MEDS BY ELIG CLIN: HCPCS

## 2025-08-27 PROCEDURE — 99214 OFFICE O/P EST MOD 30 MIN: CPT

## 2025-08-27 PROCEDURE — 1160F RVW MEDS BY RX/DR IN RCRD: CPT

## 2025-08-27 RX ORDER — TRIAMCINOLONE ACETONIDE 1 MG/G
OINTMENT TOPICAL 2 TIMES DAILY
Qty: 90 G | Refills: 0 | Status: SHIPPED | OUTPATIENT
Start: 2025-08-27 | End: 2025-09-03

## 2025-08-28 ASSESSMENT — ENCOUNTER SYMPTOMS
CONSTIPATION: 0
APNEA: 0
DIARRHEA: 0
SORE THROAT: 0
ABDOMINAL PAIN: 0
CHEST TIGHTNESS: 0
COUGH: 0
SHORTNESS OF BREATH: 0
TROUBLE SWALLOWING: 0
NAUSEA: 0

## (undated) DEVICE — CATHETER CHOLANGIOGRAM 4.5 FRX3 IN W/ 20018M55 TAUT INTRO

## (undated) DEVICE — SYRINGE MED 30ML STD CLR PLAS LUERLOCK TIP N CTRL DISP

## (undated) DEVICE — SPONGE,LAP,4"X18",XR,ST,5/PK,40PK/CS: Brand: MEDLINE INDUSTRIES, INC.

## (undated) DEVICE — APPLICATOR MEDICATED 26 CC SOLUTION HI LT ORNG CHLORAPREP

## (undated) DEVICE — TISSUE RETRIEVAL SYSTEM: Brand: INZII RETRIEVAL SYSTEM

## (undated) DEVICE — APPLIER CLP M/L SHFT DIA5MM 15 LIG LIGAMAX 5

## (undated) DEVICE — DRAPE C ARM W/ POLY STRP W42XL72IN FOR MOB XR

## (undated) DEVICE — LAPAROSCOPIC SCISSORS: Brand: EPIX LAPAROSCOPIC SCISSORS

## (undated) DEVICE — STERILE POLYISOPRENE POWDER-FREE SURGICAL GLOVES: Brand: PROTEXIS

## (undated) DEVICE — TROCAR: Brand: KII FIOS FIRST ENTRY

## (undated) DEVICE — SOLUTION IRRIG 1000ML 0.9% SOD CHL USP POUR PLAS BTL

## (undated) DEVICE — TROCAR: Brand: KII® SLEEVE

## (undated) DEVICE — SET EXTN PRIMING 4.9ML L30IN INCL SLDE CLMP SPIN M LUERLOCK

## (undated) DEVICE — CATHETER CHOLGM 4.5FR L18IN W/ MTL SUPP TB

## (undated) DEVICE — LAP CHOLE: Brand: MEDLINE INDUSTRIES, INC.

## (undated) DEVICE — SUTURE MONOCRYL + SZ 4-0 L27IN ABSRB UD L19MM PS-2 3/8 CIR MCP426H

## (undated) DEVICE — SUTURE VICRYL + SZ 0 L27IN CT 3 ABSRB VCP329H

## (undated) DEVICE — COVER LT HNDL BLU PLAS

## (undated) DEVICE — LIQUIBAND RAPID ADHESIVE 36/CS 0.8ML: Brand: MEDLINE

## (undated) DEVICE — SYRINGE, LUER LOCK, 10ML: Brand: MEDLINE

## (undated) DEVICE — INSUFFLATION NEEDLE TO ESTABLISH PNEUMOPERITONEUM.: Brand: INSUFFLATION NEEDLE